# Patient Record
Sex: MALE | Race: WHITE | NOT HISPANIC OR LATINO
[De-identification: names, ages, dates, MRNs, and addresses within clinical notes are randomized per-mention and may not be internally consistent; named-entity substitution may affect disease eponyms.]

---

## 2018-11-29 PROBLEM — Z00.00 ENCOUNTER FOR PREVENTIVE HEALTH EXAMINATION: Status: ACTIVE | Noted: 2018-11-29

## 2018-12-28 ENCOUNTER — APPOINTMENT (OUTPATIENT)
Dept: GASTROENTEROLOGY | Facility: CLINIC | Age: 53
End: 2018-12-28
Payer: COMMERCIAL

## 2018-12-28 VITALS
DIASTOLIC BLOOD PRESSURE: 80 MMHG | BODY MASS INDEX: 36.01 KG/M2 | HEART RATE: 74 BPM | HEIGHT: 77 IN | WEIGHT: 305 LBS | SYSTOLIC BLOOD PRESSURE: 110 MMHG

## 2018-12-28 PROCEDURE — 99203 OFFICE O/P NEW LOW 30 MIN: CPT

## 2018-12-28 PROCEDURE — 82270 OCCULT BLOOD FECES: CPT

## 2019-02-04 LAB
ALBUMIN SERPL ELPH-MCNC: 4.7 G/DL
ALP BLD-CCNC: 76 U/L
ALT SERPL-CCNC: 40 U/L
ANION GAP SERPL CALC-SCNC: 12 MMOL/L
AST SERPL-CCNC: 26 U/L
BASOPHILS # BLD AUTO: 0.01 K/UL
BASOPHILS NFR BLD AUTO: 0.2 %
BILIRUB SERPL-MCNC: 1.5 MG/DL
BUN SERPL-MCNC: 13 MG/DL
CALCIUM SERPL-MCNC: 9.4 MG/DL
CHLORIDE SERPL-SCNC: 104 MMOL/L
CO2 SERPL-SCNC: 25 MMOL/L
CREAT SERPL-MCNC: 0.89 MG/DL
EOSINOPHIL # BLD AUTO: 0.04 K/UL
EOSINOPHIL NFR BLD AUTO: 0.9 %
GLUCOSE SERPL-MCNC: 105 MG/DL
HCT VFR BLD CALC: 44.3 %
HGB BLD-MCNC: 14.6 G/DL
IMM GRANULOCYTES NFR BLD AUTO: 0.2 %
INR PPP: 0.99 RATIO
LYMPHOCYTES # BLD AUTO: 1.25 K/UL
LYMPHOCYTES NFR BLD AUTO: 28.4 %
MAN DIFF?: NORMAL
MCHC RBC-ENTMCNC: 28.9 PG
MCHC RBC-ENTMCNC: 33 GM/DL
MCV RBC AUTO: 87.7 FL
MONOCYTES # BLD AUTO: 0.53 K/UL
MONOCYTES NFR BLD AUTO: 12 %
NEUTROPHILS # BLD AUTO: 2.56 K/UL
NEUTROPHILS NFR BLD AUTO: 58.3 %
PLATELET # BLD AUTO: 231 K/UL
POTASSIUM SERPL-SCNC: 4.2 MMOL/L
PROT SERPL-MCNC: 6.8 G/DL
PT BLD: 11.3 SEC
RBC # BLD: 5.05 M/UL
RBC # FLD: 12.9 %
SODIUM SERPL-SCNC: 141 MMOL/L
WBC # FLD AUTO: 4.4 K/UL

## 2019-02-13 ENCOUNTER — APPOINTMENT (OUTPATIENT)
Dept: GASTROENTEROLOGY | Facility: GI CENTER | Age: 54
End: 2019-02-13
Payer: COMMERCIAL

## 2019-02-13 ENCOUNTER — RESULT REVIEW (OUTPATIENT)
Age: 54
End: 2019-02-13

## 2019-02-13 ENCOUNTER — OUTPATIENT (OUTPATIENT)
Dept: OUTPATIENT SERVICES | Facility: HOSPITAL | Age: 54
LOS: 1 days | End: 2019-02-13
Payer: COMMERCIAL

## 2019-02-13 DIAGNOSIS — Z80.0 FAMILY HISTORY OF MALIGNANT NEOPLASM OF DIGESTIVE ORGANS: ICD-10-CM

## 2019-02-13 DIAGNOSIS — D12.2 BENIGN NEOPLASM OF ASCENDING COLON: ICD-10-CM

## 2019-02-13 DIAGNOSIS — Z86.010 PERSONAL HISTORY OF COLONIC POLYPS: ICD-10-CM

## 2019-02-13 PROCEDURE — 45385 COLONOSCOPY W/LESION REMOVAL: CPT

## 2019-02-13 PROCEDURE — 45380 COLONOSCOPY AND BIOPSY: CPT | Mod: PT,XS

## 2019-02-13 PROCEDURE — 45380 COLONOSCOPY AND BIOPSY: CPT | Mod: 59

## 2019-02-13 PROCEDURE — 88305 TISSUE EXAM BY PATHOLOGIST: CPT | Mod: 26

## 2019-02-13 PROCEDURE — 45385 COLONOSCOPY W/LESION REMOVAL: CPT | Mod: PT

## 2019-02-13 PROCEDURE — 88305 TISSUE EXAM BY PATHOLOGIST: CPT

## 2019-02-13 NOTE — PHYSICAL EXAM
[Sclera] : the sclera and conjunctiva were normal [PERRL With Normal Accommodation] : pupils were equal in size, round, and reactive to light [Extraocular Movements] : extraocular movements were intact [Outer Ear] : the ears and nose were normal in appearance [Oropharynx] : the oropharynx was normal [Neck Appearance] : the appearance of the neck was normal [Neck Cervical Mass (___cm)] : no neck mass was observed [Jugular Venous Distention Increased] : there was no jugular-venous distention [Thyroid Diffuse Enlargement] : the thyroid was not enlarged [Thyroid Nodule] : there were no palpable thyroid nodules [Auscultation Breath Sounds / Voice Sounds] : lungs were clear to auscultation bilaterally [Heart Rate And Rhythm] : heart rate was normal and rhythm regular [Heart Sounds] : normal S1 and S2 [Heart Sounds Gallop] : no gallops [Murmurs] : no murmurs [Heart Sounds Pericardial Friction Rub] : no pericardial rub [Bowel Sounds] : normal bowel sounds [Abdomen Soft] : soft [Abdomen Tenderness] : non-tender [Abdomen Mass (___ Cm)] : no abdominal mass palpated [Normal Sphincter Tone] : normal sphincter tone [No Rectal Mass] : no rectal mass [Internal Hemorrhoid] : no internal hemorrhoids [External Hemorrhoid] : no external hemorrhoids [Occult Blood Positive] : stool was negative for occult blood [FreeTextEntry1] : no stool or blood in the rectal vault [Abnormal Walk] : normal gait [Nail Clubbing] : no clubbing  or cyanosis of the fingernails [Musculoskeletal - Swelling] : no joint swelling seen [Motor Tone] : muscle strength and tone were normal [Skin Color & Pigmentation] : normal skin color and pigmentation [Skin Turgor] : normal skin turgor [] : no rash

## 2019-02-13 NOTE — REASON FOR VISIT
[Follow-Up: _____] : a [unfilled] follow-up visit [Colonoscopy] : a colonoscopy [FreeTextEntry2] : PH of colon polyps

## 2019-02-13 NOTE — HISTORY OF PRESENT ILLNESS
[FreeTextEntry1] : 54 yo WM with PH of colon polyps + FH.  Last colonoscopy 4 yrs ago;  HLD.  All recent BW is normal.

## 2019-02-13 NOTE — ASSESSMENT
[FreeTextEntry1] : 4 small polyps removed.  Hold aspirin x 1 week.  Call in 1 week for path check.  Repeat colonoscopy in 3 yrs for polyp surveillance.

## 2019-02-13 NOTE — PROCEDURE
[With Biopsy] : with biopsy [With Snare Polypectomy] : snare polypectomy [With Cautery] : cautery [Hx of Colon Polyps] : history of colon polyps [Procedure Explained] : The procedure was explained [Allergies Reviewed] : allergies reviewed. [Risks] : Risks [Benefits] : benefits [Alternatives] : alternatives [Consent Obtained] : written consent was obtained prior to the procedure and is detailed in the patient's record [Patient] : the patient [Bowel Prep Kit] : the patient took the appropriate bowel preparation kit as directed [Approved Diet Followed] : the patient avoided solid foods and adhered to the approved diet list for 24 hours prior to the procedure [Automated Blood Pressure Cuff] : automated blood pressure cuff [Cardiac Monitor] : cardiac monitor [Pulse Oximeter] : pulse oximeter [Propofol ___ mg IV] : Propofol [unfilled] ~Umg intravenously [2] : 2 [Prep Qualtiy: ___] : Prep Quality:  [unfilled] [Withdrawal Time: ___] : Withdrawal Time:  [unfilled] [Left Lateral Decubitus] : The patient was positioned in the left lateral decubitus position [Abnormal Rectum] : a normal rectum [External Hemorrhoids] : no external hemorrhoids [Normal Prostate] : a normal prostate [Terminal Ileum via Ileocecal Valve] : and the terminal ileum was examined by entering the ileocecal valve [No Difficulty] : without difficulty [Insufflated] : insufflated [Multiple Passes Needed] : after multiple passes [Retroflex View] : a retroflex view of the rectum was performed [Biopsy] : biopsy [Polyps] : polyps [Hot Snare Polypectomy] : hot snare polypectomy [Normal] : Normal [Sent to Pathology] : was sent to pathology for analysis [Tolerated Well] : the patient tolerated the procedure well [Vital Signs Stable] : the vital signs were stable [No Complications] : There were no complications [de-identified] : Trilyte / split dose [de-identified] : TI / ICV were normal.  [de-identified] : At 70 cm a diminutive 3 mm sessile leopoldo 2a polyp was removed with 2 bites of the solomon bx forceps; recovered to path; site clean/ dry.   [de-identified] : At 60 cm in mid Tr colon a diminutive 6 mm leopoldo 1s  polyp was removed with hot snare; recovered to path; site clean/ dry. \par At 50 cm in distal tr colon a diminutive leopoldo 2a sessile 2 mm polyp was removed with a single bite of the solomon bx forceps; recovered to path; site clean/ dry.    [de-identified] : At 40 cm in mid DC a sessile leopoldo 1s 8mm polyp was removed with hot snare and recovered to path; site clean/ dry.  No other lesions were noted.  [de-identified] : Including a retroflexion exam.   [de-identified] : 4 polyps all small to diminutive.

## 2019-02-13 NOTE — HISTORY OF PRESENT ILLNESS
[FreeTextEntry1] : 52 yo WM with PH of colon polyps + FH.  Last colonoscopy 4 yrs ago;  HLD.  All recent BW is normal.

## 2019-02-13 NOTE — PROCEDURE
[With Biopsy] : with biopsy [With Snare Polypectomy] : snare polypectomy [With Cautery] : cautery [Hx of Colon Polyps] : history of colon polyps [Procedure Explained] : The procedure was explained [Allergies Reviewed] : allergies reviewed. [Risks] : Risks [Benefits] : benefits [Alternatives] : alternatives [Consent Obtained] : written consent was obtained prior to the procedure and is detailed in the patient's record [Patient] : the patient [Bowel Prep Kit] : the patient took the appropriate bowel preparation kit as directed [Approved Diet Followed] : the patient avoided solid foods and adhered to the approved diet list for 24 hours prior to the procedure [Automated Blood Pressure Cuff] : automated blood pressure cuff [Cardiac Monitor] : cardiac monitor [Pulse Oximeter] : pulse oximeter [Propofol ___ mg IV] : Propofol [unfilled] ~Umg intravenously [2] : 2 [Prep Qualtiy: ___] : Prep Quality:  [unfilled] [Withdrawal Time: ___] : Withdrawal Time:  [unfilled] [Left Lateral Decubitus] : The patient was positioned in the left lateral decubitus position [Abnormal Rectum] : a normal rectum [External Hemorrhoids] : no external hemorrhoids [Normal Prostate] : a normal prostate [Terminal Ileum via Ileocecal Valve] : and the terminal ileum was examined by entering the ileocecal valve [No Difficulty] : without difficulty [Insufflated] : insufflated [Multiple Passes Needed] : after multiple passes [Retroflex View] : a retroflex view of the rectum was performed [Biopsy] : biopsy [Polyps] : polyps [Hot Snare Polypectomy] : hot snare polypectomy [Normal] : Normal [Sent to Pathology] : was sent to pathology for analysis [Tolerated Well] : the patient tolerated the procedure well [Vital Signs Stable] : the vital signs were stable [No Complications] : There were no complications [de-identified] : Trilyte / split dose [de-identified] : TI / ICV were normal.  [de-identified] : At 70 cm a diminutive 3 mm sessile leopoldo 2a polyp was removed with 2 bites of the solomon bx forceps; recovered to path; site clean/ dry.   [de-identified] : At 60 cm in mid Tr colon a diminutive 6 mm leopoldo 1s  polyp was removed with hot snare; recovered to path; site clean/ dry. \par At 50 cm in distal tr colon a diminutive leopoldo 2a sessile 2 mm polyp was removed with a single bite of the solomon bx forceps; recovered to path; site clean/ dry.    [de-identified] : At 40 cm in mid DC a sessile leopoldo 1s 8mm polyp was removed with hot snare and recovered to path; site clean/ dry.  No other lesions were noted.  [de-identified] : Including a retroflexion exam.   [de-identified] : 4 polyps all small to diminutive.

## 2019-02-15 LAB — SURGICAL PATHOLOGY STUDY: SIGNIFICANT CHANGE UP

## 2022-05-24 ENCOUNTER — APPOINTMENT (OUTPATIENT)
Dept: GASTROENTEROLOGY | Facility: CLINIC | Age: 57
End: 2022-05-24
Payer: COMMERCIAL

## 2022-05-24 VITALS
DIASTOLIC BLOOD PRESSURE: 80 MMHG | TEMPERATURE: 98 F | WEIGHT: 304 LBS | SYSTOLIC BLOOD PRESSURE: 140 MMHG | OXYGEN SATURATION: 98 % | BODY MASS INDEX: 35.89 KG/M2 | RESPIRATION RATE: 16 BRPM | HEIGHT: 77 IN | HEART RATE: 73 BPM

## 2022-05-24 DIAGNOSIS — Z12.11 ENCOUNTER FOR SCREENING FOR MALIGNANT NEOPLASM OF COLON: ICD-10-CM

## 2022-05-24 PROCEDURE — 99204 OFFICE O/P NEW MOD 45 MIN: CPT

## 2022-05-24 RX ORDER — ATORVASTATIN CALCIUM 10 MG/1
10 TABLET, FILM COATED ORAL
Refills: 0 | Status: ACTIVE | COMMUNITY

## 2022-05-24 RX ORDER — ASPIRIN 81 MG
81 TABLET, DELAYED RELEASE (ENTERIC COATED) ORAL
Refills: 0 | Status: ACTIVE | COMMUNITY

## 2022-05-24 RX ORDER — POLYETHYLENE GLYOCOL 3350, SODIUM CHLORIDE, SODIUM BICARBONATE AND POTASSIUM CHLORIDE 420; 11.2; 5.72; 1.48 G/4L; G/4L; G/4L; G/4L
420 POWDER, FOR SOLUTION NASOGASTRIC; ORAL
Qty: 1 | Refills: 0 | Status: ACTIVE | COMMUNITY
Start: 2018-12-28

## 2022-05-24 RX ORDER — POLYETHYLENE GLYCOL-3350, SODIUM CHLORIDE, POTASSIUM CHLORIDE AND SODIUM BICARBONATE 420; 11.2; 5.72; 1.48 G/438.4G; G/438.4G; G/438.4G; G/438.4G
420 POWDER, FOR SOLUTION ORAL
Qty: 1 | Refills: 0 | Status: ACTIVE | COMMUNITY
Start: 2022-05-24 | End: 1900-01-01

## 2022-05-24 NOTE — HISTORY OF PRESENT ILLNESS
[FreeTextEntry1] : 56-year-old white male with history of hyperlipidemia and obesity with BMI of 36.\par Positive family history of 1 first-degree relative, mother with colon cancer at age 62.\par Positive personal history of colon polyps.  Initial colonoscopy done in 2015 revealed a large sigmoid colon 3 cm villotubular adenoma which was completely removed.  Also 2 other tubular adenomas.\par Repeat colonoscopy in 2019 revealed 4 small tubular adenomas will completely removed.\par Patient remains asymptomatic from a GI point of view.  He is passing normal bowel movements.  He denies having any rectal bleeding abdominal pain or alteration in pattern of bowel movements.  No history of anemia.  Recent blood work was done via PCP Dr. Jane West about 3 months ago and all was normal.\par Patient had pulmonary COVID in 11/20 but did not require hospitalization or unusual treatment.  He is not left with any deficit.  Remains robust.  No cardiopulmonary issues.  Only medication is for hyperlipidemia.  Patient takes occasional aspirin.  No hypertension diabetes or heart disease.  No shortness of breath or JOYA.\par \par

## 2022-05-24 NOTE — ASSESSMENT
[FreeTextEntry1] : 1 first-degree relative with colon cancer, mother at age 62.  Personal history of colon polyps including large villotubular adenoma initially removed 7 years ago.  Appropriate candidate for surveillance colonoscopy.  Currently asymptomatic from a GI point of view.  Completely recovered from COVID.  Past surgical history none.  As such a surveillance colonoscopy was offered to the patient.  The procedure, its risk benefits and prep, were explained to the patient, who understands and is agreeable to proceed.  ASA #2.  Blood work from PCP will be reviewed when available.  Additional blood work is not anticipated to be necessary.  Patient is in optimal medical condition to undergo planned procedure.  No cardiopulmonary symptoms.  Gavel light prep to be utilized, split dose fashion.  Arrangements to be made.  Results to follow.

## 2022-05-24 NOTE — REASON FOR VISIT
[Consultation] : a consultation visit [FreeTextEntry1] : Positive family history for colon cancer.  Positive personal history of colon polyps

## 2022-05-24 NOTE — CONSULT LETTER
[Dear  ___] : Dear  [unfilled], [Consult Letter:] : I had the pleasure of evaluating your patient, [unfilled]. [Please see my note below.] : Please see my note below. [Consult Closing:] : Thank you very much for allowing me to participate in the care of this patient.  If you have any questions, please do not hesitate to contact me. [Sincerely,] : Sincerely, [FreeTextEntry1] : Increased risk for development of colorectal neoplasia as a result of personal history of colon polyps and positive family history of one first-degree relative.  Therefore surveillance colonoscopy is appropriate and will be arranged in the near future.  Results to follow. [FreeTextEntry3] : Delfin Lane MD FACG\par Diplomate American Board of Internal Medicine and Gastroenterolgy\par [facility]\par

## 2022-06-28 ENCOUNTER — TRANSCRIPTION ENCOUNTER (OUTPATIENT)
Age: 57
End: 2022-06-28

## 2022-06-29 ENCOUNTER — OUTPATIENT (OUTPATIENT)
Dept: OUTPATIENT SERVICES | Facility: HOSPITAL | Age: 57
LOS: 1 days | End: 2022-06-29
Payer: COMMERCIAL

## 2022-06-29 ENCOUNTER — APPOINTMENT (OUTPATIENT)
Dept: GASTROENTEROLOGY | Facility: GI CENTER | Age: 57
End: 2022-06-29
Payer: COMMERCIAL

## 2022-06-29 ENCOUNTER — NON-APPOINTMENT (OUTPATIENT)
Age: 57
End: 2022-06-29

## 2022-06-29 ENCOUNTER — RESULT REVIEW (OUTPATIENT)
Age: 57
End: 2022-06-29

## 2022-06-29 DIAGNOSIS — Z80.0 FAMILY HISTORY OF MALIGNANT NEOPLASM OF DIGESTIVE ORGANS: ICD-10-CM

## 2022-06-29 DIAGNOSIS — Z86.39 PERSONAL HISTORY OF OTHER ENDOCRINE, NUTRITIONAL AND METABOLIC DISEASE: ICD-10-CM

## 2022-06-29 DIAGNOSIS — D17.5 BENIGN LIPOMATOUS NEOPLASM OF INTRA-ABDOMINAL ORGANS: ICD-10-CM

## 2022-06-29 DIAGNOSIS — K63.5 POLYP OF COLON: ICD-10-CM

## 2022-06-29 DIAGNOSIS — Z12.11 ENCOUNTER FOR SCREENING FOR MALIGNANT NEOPLASM OF COLON: ICD-10-CM

## 2022-06-29 DIAGNOSIS — Z86.010 PERSONAL HISTORY OF COLONIC POLYPS: ICD-10-CM

## 2022-06-29 PROCEDURE — 88305 TISSUE EXAM BY PATHOLOGIST: CPT | Mod: 26

## 2022-06-29 PROCEDURE — 88305 TISSUE EXAM BY PATHOLOGIST: CPT

## 2022-06-29 PROCEDURE — 45380 COLONOSCOPY AND BIOPSY: CPT | Mod: PT

## 2022-06-29 PROCEDURE — 45380 COLONOSCOPY AND BIOPSY: CPT | Mod: 33

## 2022-06-29 NOTE — PHYSICAL EXAM
[Sclera] : the sclera and conjunctiva were normal [PERRL With Normal Accommodation] : pupils were equal in size, round, and reactive to light [Extraocular Movements] : extraocular movements were intact [Outer Ear] : the ears and nose were normal in appearance [Oropharynx] : the oropharynx was normal [Neck Appearance] : the appearance of the neck was normal [Neck Cervical Mass (___cm)] : no neck mass was observed [Jugular Venous Distention Increased] : there was no jugular-venous distention [Thyroid Diffuse Enlargement] : the thyroid was not enlarged [Thyroid Nodule] : there were no palpable thyroid nodules [Auscultation Breath Sounds / Voice Sounds] : lungs were clear to auscultation bilaterally [Heart Rate And Rhythm] : heart rate was normal and rhythm regular [Heart Sounds] : normal S1 and S2 [Heart Sounds Gallop] : no gallops [Murmurs] : no murmurs [Heart Sounds Pericardial Friction Rub] : no pericardial rub [Abdomen Soft] : soft [Bowel Sounds] : normal bowel sounds [Abdomen Tenderness] : non-tender [Abdomen Mass (___ Cm)] : no abdominal mass palpated [Normal Sphincter Tone] : normal sphincter tone [No Rectal Mass] : no rectal mass [Internal Hemorrhoid] : no internal hemorrhoids [External Hemorrhoid] : no external hemorrhoids [Occult Blood Positive] : stool was negative for occult blood [FreeTextEntry1] : no stool or blood in the rectal vault [Abnormal Walk] : normal gait [Nail Clubbing] : no clubbing  or cyanosis of the fingernails [Musculoskeletal - Swelling] : no joint swelling seen [Motor Tone] : muscle strength and tone were normal [Skin Turgor] : normal skin turgor [Skin Color & Pigmentation] : normal skin color and pigmentation [] : no rash

## 2022-06-29 NOTE — HISTORY OF PRESENT ILLNESS
[FreeTextEntry1] : 56-year-old white male with history of hyperlipidemia and obesity BMI 36 and positive family history of colon cancer in 1 first-degree relative, mother at 62; positive personal history of colon polyps.  Status post removal of a large villotubular adenoma from sigmoid colon in 2015.  A surveillance colonoscopy in 2019 revealed 4 separate small tubular adenomas were removed.  Patient remains asymptomatic.  Recent blood work was done via PCP not currently available.  Prior blood work from 2019 was normal.  No new medical issues.  On atorvastatin for hyperlipidemia.  1 Ecotrin per day.  Completed gavel light prep.  COVID swab negative.  No cardiopulmonary issues.

## 2022-07-01 LAB — SURGICAL PATHOLOGY STUDY: SIGNIFICANT CHANGE UP

## 2022-07-14 ENCOUNTER — APPOINTMENT (OUTPATIENT)
Dept: SURGERY | Facility: CLINIC | Age: 57
End: 2022-07-14

## 2022-07-14 VITALS
SYSTOLIC BLOOD PRESSURE: 149 MMHG | DIASTOLIC BLOOD PRESSURE: 92 MMHG | HEIGHT: 77 IN | RESPIRATION RATE: 16 BRPM | HEART RATE: 81 BPM | OXYGEN SATURATION: 98 % | TEMPERATURE: 98 F | WEIGHT: 297 LBS | BODY MASS INDEX: 35.07 KG/M2

## 2022-07-14 DIAGNOSIS — Z78.9 OTHER SPECIFIED HEALTH STATUS: ICD-10-CM

## 2022-07-14 PROCEDURE — 99243 OFF/OP CNSLTJ NEW/EST LOW 30: CPT

## 2022-07-14 NOTE — HISTORY OF PRESENT ILLNESS
[de-identified] : The patient comes to the office in consultation by Dr. 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  The patient denies any abdominal pain, nausea or vomit.  He has no changes in his bowel function and denies any BRBPR.  The patient does have a family history of colon cancer.

## 2022-07-14 NOTE — CONSULT LETTER
[Dear  ___] : Dear  [unfilled], [Consult Letter:] : I had the pleasure of evaluating your patient, [unfilled]. [Please see my note below.] : Please see my note below. [Consult Closing:] : Thank you very much for allowing me to participate in the care of this patient.  If you have any questions, please do not hesitate to contact me. [Sincerely,] : Sincerely, [FreeTextEntry3] : Kevin Prieto MD, FACS\par  \par Department of Surgery\par [facility]\par [facility]\par

## 2022-07-14 NOTE — ASSESSMENT
[FreeTextEntry1] : The patient is an obese 56 year old male who has been identified to have a mucocele of the appendix on recent colonoscopy.  The patient at this point has been advised that he will benefit from removal of the appendix.  He will first need a CT scan to evaluate the appendix in more detail to exclude suggestions of malignant features as this will potentially change the surgical approach.  He has been advised that although the majority of these tend to be benign, there is a concern for possible malignancy.   The patient admitted understanding and will proceed with the CT scan as planned.  He will return upon completion for further recommendations.  A total of 40 minutes was spent coordinating the patient's care.

## 2022-07-14 NOTE — PHYSICAL EXAM
[JVD] : no jugular venous distention  [Abdominal Masses] : No abdominal masses [Abdomen Tenderness] : ~T ~M No abdominal tenderness [Purpura] : no purpura  [Petechiae] : no petechiae [Skin Ulcer] : no ulcer [Skin Induration] : no induration [Alert] : alert [Oriented to Person] : oriented to person [Oriented to Place] : oriented to place [Oriented to Time] : oriented to time [Calm] : calm [de-identified] : non toxic, in no acute distress  [de-identified] : NC/AT PERRL EOMI no scleral icterus  [de-identified] : trachea midline, no gross mass  [de-identified] : no audible wheezing or stridor  [de-identified] : obese soft, no localizing tenderness, no guarding, no rebound, no masses  [de-identified] : FROM of all extremities with no gross deformity or angulation, there is a small umbilical hernia, no ventral hernia  [de-identified] : mood is calm

## 2022-07-14 NOTE — DATA REVIEWED
[FreeTextEntry1] : Independent review of the colonoscopy report reveals a mucocele of the appendix with no associated mass, there was a polyp removed of the distal transverse colon

## 2022-07-29 ENCOUNTER — APPOINTMENT (OUTPATIENT)
Dept: CT IMAGING | Facility: CLINIC | Age: 57
End: 2022-07-29

## 2022-07-29 ENCOUNTER — OUTPATIENT (OUTPATIENT)
Dept: OUTPATIENT SERVICES | Facility: HOSPITAL | Age: 57
LOS: 1 days | End: 2022-07-29
Payer: COMMERCIAL

## 2022-07-29 DIAGNOSIS — Z86.010 PERSONAL HISTORY OF COLONIC POLYPS: ICD-10-CM

## 2022-07-29 PROCEDURE — 74177 CT ABD & PELVIS W/CONTRAST: CPT | Mod: 26

## 2022-07-29 PROCEDURE — 74177 CT ABD & PELVIS W/CONTRAST: CPT

## 2022-08-08 ENCOUNTER — APPOINTMENT (OUTPATIENT)
Dept: SURGERY | Facility: CLINIC | Age: 57
End: 2022-08-08

## 2022-08-08 VITALS
HEART RATE: 76 BPM | DIASTOLIC BLOOD PRESSURE: 71 MMHG | OXYGEN SATURATION: 96 % | RESPIRATION RATE: 14 BRPM | BODY MASS INDEX: 35.3 KG/M2 | TEMPERATURE: 97.2 F | WEIGHT: 299 LBS | SYSTOLIC BLOOD PRESSURE: 118 MMHG | HEIGHT: 77 IN

## 2022-08-08 PROCEDURE — 99214 OFFICE O/P EST MOD 30 MIN: CPT

## 2022-08-08 NOTE — ASSESSMENT
[FreeTextEntry1] : The patient is an obese male with findings on colonoscopic concerning for a mucocele of the appendix.  The patient had a CT scan that reveals a normal appearing appendix with no evidence of mucocele.  The patient at this point has been advised that I am in favor of follow up imaging in 3 months to reevaluate the appendix.  I do not believe that appendectomy at this time is indicated in light of the CT scan findings.  The patient is in agreement with the treatment strategy.  He will follow up in 4 weeks to reassess if any symptoms develop and to set him up for a repeat CT scan for 3 months.  A total of 30 minutes was spent coordinating the patient's care.

## 2022-08-08 NOTE — HISTORY OF PRESENT ILLNESS
[de-identified] : The patient returns to the office with no complaints.  The patient is without abdominal pain, nausea, or vomit.  The patient is without blood per rectum and has no changes in his bowel function.

## 2022-08-08 NOTE — PHYSICAL EXAM
[JVD] : no jugular venous distention  [Abdominal Masses] : No abdominal masses [Abdomen Tenderness] : ~T ~M No abdominal tenderness [Purpura] : no purpura  [Petechiae] : no petechiae [Skin Ulcer] : no ulcer [Skin Induration] : no induration [Alert] : alert [Oriented to Person] : oriented to person [Oriented to Place] : oriented to place [Oriented to Time] : oriented to time [Calm] : calm [de-identified] : non toxic, in no acute distress  [de-identified] : NC/AT PERRL EOMI no scleral icterus  [de-identified] : trachea midline, no gross mass  [de-identified] : no audible wheezing or stridor  [de-identified] : obese soft, remains with no localizing tenderness, no guarding, no rebound, no masses  [de-identified] : FROM of all extremities with no gross deformity or angulation, there is a small umbilical hernia, no ventral hernia  [de-identified] : mood is calm

## 2022-08-08 NOTE — DATA REVIEWED
[FreeTextEntry1] : Independent review of the abd/pel CT scan reveals a normal appearing appedix with no evidence of mucocele.

## 2022-09-07 ENCOUNTER — APPOINTMENT (OUTPATIENT)
Dept: SURGERY | Facility: CLINIC | Age: 57
End: 2022-09-07

## 2022-09-07 VITALS
OXYGEN SATURATION: 98 % | WEIGHT: 302 LBS | HEIGHT: 77 IN | SYSTOLIC BLOOD PRESSURE: 154 MMHG | TEMPERATURE: 97.3 F | BODY MASS INDEX: 35.66 KG/M2 | DIASTOLIC BLOOD PRESSURE: 93 MMHG | HEART RATE: 70 BPM | RESPIRATION RATE: 16 BRPM

## 2022-09-07 PROCEDURE — 99213 OFFICE O/P EST LOW 20 MIN: CPT

## 2022-09-07 NOTE — PHYSICAL EXAM
[JVD] : no jugular venous distention  [Abdominal Masses] : No abdominal masses [Abdomen Tenderness] : ~T ~M No abdominal tenderness [Purpura] : no purpura  [Petechiae] : no petechiae [Skin Ulcer] : no ulcer [Skin Induration] : no induration [Alert] : alert [Oriented to Person] : oriented to person [Oriented to Place] : oriented to place [Oriented to Time] : oriented to time [Calm] : calm [de-identified] : non toxic, in no acute distress  [de-identified] : NC/AT PERRL EOMI no scleral icterus  [de-identified] : trachea midline, no gross mass  [de-identified] : no audible wheezing or stridor  [de-identified] : obese soft, remains with no localizing tenderness, no guarding, no rebound, no masses  [de-identified] : FROM of all extremities with no gross deformity or angulation, there is a small umbilical hernia, no ventral hernia  [de-identified] : mood is calm

## 2022-09-07 NOTE — HISTORY OF PRESENT ILLNESS
[de-identified] : The patient returns to the office with no complaints.  He has no abdominal pain, nausea, or vomit. He has no changes in his bowel function.

## 2022-09-07 NOTE — ASSESSMENT
[FreeTextEntry1] : The patient is a 56 year old male with a question of an appendiceal mucocele.  The patient is completely asymptomatic. He is to undergo an interval CT scan to assess the appendix.  He will follow up in 8 weeks to set up the follow up CT scan imaging.  A total of 20 minutes was spent coordinating the care of the patient.

## 2022-11-14 ENCOUNTER — APPOINTMENT (OUTPATIENT)
Dept: SURGERY | Facility: CLINIC | Age: 57
End: 2022-11-14

## 2022-11-14 VITALS
RESPIRATION RATE: 16 BRPM | TEMPERATURE: 97.4 F | SYSTOLIC BLOOD PRESSURE: 146 MMHG | BODY MASS INDEX: 35.54 KG/M2 | WEIGHT: 301 LBS | HEIGHT: 77 IN | OXYGEN SATURATION: 98 % | DIASTOLIC BLOOD PRESSURE: 87 MMHG | HEART RATE: 71 BPM

## 2022-11-14 PROCEDURE — 99213 OFFICE O/P EST LOW 20 MIN: CPT

## 2022-11-14 NOTE — ASSESSMENT
[FreeTextEntry1] : The patient is a 57 year old male with a question of an appendiceal mucocele.  The patient at this point will benefit from follow up imaging to assess the appendix.  Further recommendations will follow review of the study. A total of 20 minutes was spent coordinating the patient's care.

## 2022-11-14 NOTE — PHYSICAL EXAM
[JVD] : no jugular venous distention  [Abdominal Masses] : No abdominal masses [Abdomen Tenderness] : ~T ~M No abdominal tenderness [Purpura] : no purpura  [Petechiae] : no petechiae [Skin Ulcer] : no ulcer [Skin Induration] : no induration [Alert] : alert [Oriented to Person] : oriented to person [Oriented to Place] : oriented to place [Oriented to Time] : oriented to time [Calm] : calm [de-identified] : NC/AT PERRL EOMI no scleral icterus  [de-identified] : non toxic, in no acute distress  [de-identified] : trachea midline, no gross mass  [de-identified] : no audible wheezing or stridor  [de-identified] : obese soft, remains with no localizing tenderness, no guarding, no rebound, no masses  [de-identified] : FROM of all extremities with no gross deformity or angulation, there is a small umbilical hernia, no ventral hernia  [de-identified] : mood is calm

## 2022-11-14 NOTE — HISTORY OF PRESENT ILLNESS
[de-identified] : The patient returns with no abdominal pain, nausea, or vomit.  The patient has no changes in his bowel function, no BRBPR or mucus.  He states he has been feeling well.

## 2022-11-15 ENCOUNTER — RESULT REVIEW (OUTPATIENT)
Age: 57
End: 2022-11-15

## 2022-11-28 ENCOUNTER — OUTPATIENT (OUTPATIENT)
Dept: OUTPATIENT SERVICES | Facility: HOSPITAL | Age: 57
LOS: 1 days | End: 2022-11-28
Payer: COMMERCIAL

## 2022-11-28 ENCOUNTER — APPOINTMENT (OUTPATIENT)
Dept: CT IMAGING | Facility: CLINIC | Age: 57
End: 2022-11-28

## 2022-11-28 DIAGNOSIS — K38.8 OTHER SPECIFIED DISEASES OF APPENDIX: ICD-10-CM

## 2022-11-28 PROCEDURE — 74177 CT ABD & PELVIS W/CONTRAST: CPT

## 2022-11-28 PROCEDURE — 74177 CT ABD & PELVIS W/CONTRAST: CPT | Mod: 26

## 2022-12-14 ENCOUNTER — APPOINTMENT (OUTPATIENT)
Dept: SURGERY | Facility: CLINIC | Age: 57
End: 2022-12-14

## 2022-12-14 VITALS
TEMPERATURE: 96.3 F | RESPIRATION RATE: 16 BRPM | HEART RATE: 85 BPM | HEIGHT: 77 IN | OXYGEN SATURATION: 98 % | BODY MASS INDEX: 36.37 KG/M2 | SYSTOLIC BLOOD PRESSURE: 146 MMHG | DIASTOLIC BLOOD PRESSURE: 87 MMHG | WEIGHT: 308 LBS

## 2022-12-14 DIAGNOSIS — Z01.818 ENCOUNTER FOR OTHER PREPROCEDURAL EXAMINATION: ICD-10-CM

## 2022-12-14 DIAGNOSIS — K38.8 OTHER SPECIFIED DISEASES OF APPENDIX: ICD-10-CM

## 2022-12-14 DIAGNOSIS — E66.9 OBESITY, UNSPECIFIED: ICD-10-CM

## 2022-12-14 DIAGNOSIS — K42.9 UMBILICAL HERNIA W/OUT OBSTRUCTION OR GANGRENE: ICD-10-CM

## 2022-12-14 PROCEDURE — 99214 OFFICE O/P EST MOD 30 MIN: CPT

## 2022-12-14 NOTE — PHYSICAL EXAM
[JVD] : no jugular venous distention  [Abdominal Masses] : No abdominal masses [Abdomen Tenderness] : ~T ~M No abdominal tenderness [Purpura] : no purpura  [Petechiae] : no petechiae [Skin Ulcer] : no ulcer [Skin Induration] : no induration [Alert] : alert [Oriented to Person] : oriented to person [Oriented to Place] : oriented to place [Oriented to Time] : oriented to time [Calm] : calm [de-identified] : non toxic, in no acute distress  [de-identified] : NC/AT PERRL EOMI no scleral icterus  [de-identified] : trachea midline, no gross mass  [de-identified] : no audible wheezing or stridor  [de-identified] : obese soft, remains with no localizing tenderness, no guarding, no rebound, no masses  [de-identified] : FROM of all extremities with no gross deformity or angulation, there remains a small umbilical hernia, no ventral hernia  [de-identified] : mood is calm

## 2022-12-14 NOTE — HISTORY OF PRESENT ILLNESS
[de-identified] : The patient returns to the office with no abdominal pain, nausea, or vomit.  The patient reports he has been feeling well and has no changes in his bowel function.  He has no blood per rectum.

## 2022-12-14 NOTE — ASSESSMENT
[FreeTextEntry1] : The patient is an obese 57 year old male with an appendiceal mucocele and umbilical hernia.  The patient has been advised that he will benefit from an appendectomy and partial cecectomy to clear mucocele. We will also repair the umbilical hernia in the same setting.  The risks, benefits, and alternatives including the option of doing nothing to a robotic, possible laparoscopic and possible open appendectomy and partial cecectomy and  open umbilical hernia repair with possible mesh were discussed.  The potential complications including but not limited to infection, bleeding, hernia recurrence, anastomotic leak, post operative abscess or collection, chronic post-operative pain, and seroma formation were discussed.  The patient was educated regarding the signs and symptoms of hernia strangulation and advised to seek immediate MD evaluation should this occur.  The patient understands and wishes to proceed at the next available time.  A total of 30 minutes was spent coordinating the patient's care. \par

## 2022-12-14 NOTE — DATA REVIEWED
[FreeTextEntry1] : Independent review of the abd/pel CT scan from 11/28/22 reveals a mucocele of the appendiceal orifice with no periappendiceal stranding or adenopathy.

## 2023-02-02 ENCOUNTER — OUTPATIENT (OUTPATIENT)
Dept: OUTPATIENT SERVICES | Facility: HOSPITAL | Age: 58
LOS: 1 days | End: 2023-02-02
Payer: COMMERCIAL

## 2023-02-02 VITALS
HEART RATE: 61 BPM | DIASTOLIC BLOOD PRESSURE: 80 MMHG | WEIGHT: 304.24 LBS | TEMPERATURE: 97 F | RESPIRATION RATE: 16 BRPM | SYSTOLIC BLOOD PRESSURE: 125 MMHG | OXYGEN SATURATION: 98 % | HEIGHT: 77 IN

## 2023-02-02 DIAGNOSIS — K42.9 UMBILICAL HERNIA WITHOUT OBSTRUCTION OR GANGRENE: ICD-10-CM

## 2023-02-02 DIAGNOSIS — Z98.890 OTHER SPECIFIED POSTPROCEDURAL STATES: Chronic | ICD-10-CM

## 2023-02-02 DIAGNOSIS — Z29.9 ENCOUNTER FOR PROPHYLACTIC MEASURES, UNSPECIFIED: ICD-10-CM

## 2023-02-02 DIAGNOSIS — Z91.89 OTHER SPECIFIED PERSONAL RISK FACTORS, NOT ELSEWHERE CLASSIFIED: ICD-10-CM

## 2023-02-02 DIAGNOSIS — K38.8 OTHER SPECIFIED DISEASES OF APPENDIX: ICD-10-CM

## 2023-02-02 DIAGNOSIS — Z01.818 ENCOUNTER FOR OTHER PREPROCEDURAL EXAMINATION: ICD-10-CM

## 2023-02-02 LAB
A1C WITH ESTIMATED AVERAGE GLUCOSE RESULT: 5.4 % — SIGNIFICANT CHANGE UP (ref 4–5.6)
ALBUMIN SERPL ELPH-MCNC: 4.3 G/DL — SIGNIFICANT CHANGE UP (ref 3.3–5.2)
ALP SERPL-CCNC: 81 U/L — SIGNIFICANT CHANGE UP (ref 40–120)
ALT FLD-CCNC: 28 U/L — SIGNIFICANT CHANGE UP
ANION GAP SERPL CALC-SCNC: 10 MMOL/L — SIGNIFICANT CHANGE UP (ref 5–17)
APTT BLD: 31.1 SEC — SIGNIFICANT CHANGE UP (ref 27.5–35.5)
AST SERPL-CCNC: 24 U/L — SIGNIFICANT CHANGE UP
BASOPHILS # BLD AUTO: 0.04 K/UL — SIGNIFICANT CHANGE UP (ref 0–0.2)
BASOPHILS NFR BLD AUTO: 0.8 % — SIGNIFICANT CHANGE UP (ref 0–2)
BILIRUB SERPL-MCNC: 1.5 MG/DL — SIGNIFICANT CHANGE UP (ref 0.4–2)
BLD GP AB SCN SERPL QL: SIGNIFICANT CHANGE UP
BUN SERPL-MCNC: 17.5 MG/DL — SIGNIFICANT CHANGE UP (ref 8–20)
CALCIUM SERPL-MCNC: 9.1 MG/DL — SIGNIFICANT CHANGE UP (ref 8.4–10.5)
CHLORIDE SERPL-SCNC: 105 MMOL/L — SIGNIFICANT CHANGE UP (ref 96–108)
CO2 SERPL-SCNC: 27 MMOL/L — SIGNIFICANT CHANGE UP (ref 22–29)
CREAT SERPL-MCNC: 0.82 MG/DL — SIGNIFICANT CHANGE UP (ref 0.5–1.3)
EGFR: 103 ML/MIN/1.73M2 — SIGNIFICANT CHANGE UP
EOSINOPHIL # BLD AUTO: 0.07 K/UL — SIGNIFICANT CHANGE UP (ref 0–0.5)
EOSINOPHIL NFR BLD AUTO: 1.4 % — SIGNIFICANT CHANGE UP (ref 0–6)
ESTIMATED AVERAGE GLUCOSE: 108 MG/DL — SIGNIFICANT CHANGE UP (ref 68–114)
GLUCOSE SERPL-MCNC: 111 MG/DL — HIGH (ref 70–99)
HCT VFR BLD CALC: 41.3 % — SIGNIFICANT CHANGE UP (ref 39–50)
HGB BLD-MCNC: 13.9 G/DL — SIGNIFICANT CHANGE UP (ref 13–17)
IMM GRANULOCYTES NFR BLD AUTO: 0.2 % — SIGNIFICANT CHANGE UP (ref 0–0.9)
INR BLD: 1.03 RATIO — SIGNIFICANT CHANGE UP (ref 0.88–1.16)
LYMPHOCYTES # BLD AUTO: 1.57 K/UL — SIGNIFICANT CHANGE UP (ref 1–3.3)
LYMPHOCYTES # BLD AUTO: 30.4 % — SIGNIFICANT CHANGE UP (ref 13–44)
MCHC RBC-ENTMCNC: 28.5 PG — SIGNIFICANT CHANGE UP (ref 27–34)
MCHC RBC-ENTMCNC: 33.7 GM/DL — SIGNIFICANT CHANGE UP (ref 32–36)
MCV RBC AUTO: 84.6 FL — SIGNIFICANT CHANGE UP (ref 80–100)
MONOCYTES # BLD AUTO: 0.52 K/UL — SIGNIFICANT CHANGE UP (ref 0–0.9)
MONOCYTES NFR BLD AUTO: 10.1 % — SIGNIFICANT CHANGE UP (ref 2–14)
NEUTROPHILS # BLD AUTO: 2.95 K/UL — SIGNIFICANT CHANGE UP (ref 1.8–7.4)
NEUTROPHILS NFR BLD AUTO: 57.1 % — SIGNIFICANT CHANGE UP (ref 43–77)
PLATELET # BLD AUTO: 221 K/UL — SIGNIFICANT CHANGE UP (ref 150–400)
POTASSIUM SERPL-MCNC: 3.9 MMOL/L — SIGNIFICANT CHANGE UP (ref 3.5–5.3)
POTASSIUM SERPL-SCNC: 3.9 MMOL/L — SIGNIFICANT CHANGE UP (ref 3.5–5.3)
PROT SERPL-MCNC: 6.8 G/DL — SIGNIFICANT CHANGE UP (ref 6.6–8.7)
PROTHROM AB SERPL-ACNC: 12 SEC — SIGNIFICANT CHANGE UP (ref 10.5–13.4)
RBC # BLD: 4.88 M/UL — SIGNIFICANT CHANGE UP (ref 4.2–5.8)
RBC # FLD: 12.2 % — SIGNIFICANT CHANGE UP (ref 10.3–14.5)
SODIUM SERPL-SCNC: 142 MMOL/L — SIGNIFICANT CHANGE UP (ref 135–145)
WBC # BLD: 5.16 K/UL — SIGNIFICANT CHANGE UP (ref 3.8–10.5)
WBC # FLD AUTO: 5.16 K/UL — SIGNIFICANT CHANGE UP (ref 3.8–10.5)

## 2023-02-02 PROCEDURE — 93005 ELECTROCARDIOGRAM TRACING: CPT

## 2023-02-02 PROCEDURE — 93010 ELECTROCARDIOGRAM REPORT: CPT

## 2023-02-02 PROCEDURE — G0463: CPT

## 2023-02-02 RX ORDER — BUPIVACAINE 13.3 MG/ML
20 INJECTION, SUSPENSION, LIPOSOMAL INFILTRATION ONCE
Refills: 0 | Status: DISCONTINUED | OUTPATIENT
Start: 2023-02-27 | End: 2023-02-27

## 2023-02-02 RX ORDER — CEFOTETAN DISODIUM 1 G
2 VIAL (EA) INJECTION ONCE
Refills: 0 | Status: DISCONTINUED | OUTPATIENT
Start: 2023-02-27 | End: 2023-02-27

## 2023-02-02 RX ORDER — SODIUM CHLORIDE 9 MG/ML
3 INJECTION INTRAMUSCULAR; INTRAVENOUS; SUBCUTANEOUS EVERY 8 HOURS
Refills: 0 | Status: DISCONTINUED | OUTPATIENT
Start: 2023-02-27 | End: 2023-02-27

## 2023-02-02 NOTE — H&P PST ADULT - NSICDXFAMILYHX_GEN_ALL_CORE_FT
FAMILY HISTORY:  Father  Still living? Unknown  Family history of early CAD, Age at diagnosis: Age Unknown    Mother  Still living? Unknown  FH: colon cancer, Age at diagnosis: Age Unknown

## 2023-02-02 NOTE — H&P PST ADULT - PROBLEM SELECTOR PLAN 1
Patient is scheduled for robotic possible laparoscopic possible open appendectomy with partial cecectomy on 2/27/23 with Dr Prieto.   Medical evaluation pending

## 2023-02-02 NOTE — H&P PST ADULT - HISTORY OF PRESENT ILLNESS
57 year old  male with a pmhx of vertigo, HLD, family hx of colon ca s/p colonoscopy May 2022, small polyp removed fluid found in appendix, CT at that time was negative, plan to repeat in 3 months   patient had CT 11/2022 that revealed rounded hypoattenuating fullness of the appendiceal orifice,   which may represent a mucocele.  Patient denies fever chills, RUQ pain, abdominal pain, nausea, vomiting, diarrhea, constipation  Patient is scheduled for robotic possible laparoscopic possible open appendectomy with partial cecectomy on 2/27/23 with Dr Prieto.   Medical evaluation  pending  57 year old  male with a pmhx of vertigo, HLD, family hx of colon ca s/p colonoscopy May 2022, small polyp removed fluid found in appendix, CT at that time was negative, plan to repeat in 3 months . Patient had CT 11/2022 that revealed rounded hypoattenuating fullness of the appendiceal orifice, which may represent a mucocele. Patient denies fever chills, RUQ pain, abdominal pain, nausea, vomiting, diarrhea, constipation. Patient is scheduled for robotic possible laparoscopic possible open appendectomy with partial cecectomy on 2/27/23 with Dr Prieto.   Medical evaluation  pending

## 2023-02-02 NOTE — H&P PST ADULT - ASSESSMENT
57 year old  male with a pmhx of vertigo, HLD, family hx of colon ca s/p colonoscopy May 2022, small polyp removed fluid found in appendix, CT at that time was negative, plan to repeat in 3 months . Patient had CT 2022 that revealed rounded hypoattenuating fullness of the appendiceal orifice, which may represent a mucocele. Abdomen is soft non tender, no RLQ . Patient is scheduled for robotic possible laparoscopic possible open appendectomy with partial cecectomy on 23 with Dr Prieto. Patient educated on surgical scrub, COVID testing, preadmission instructions, medical clearance and day of procedure medications, verbalizes understanding. Pt instructed to stop vitamins/supplements/herbal medications/ASA/NSAIDS for one week prior to surgery and discuss with PMD.     CAPRINI SCORE    AGE RELATED RISK FACTORS                                                             [x ] Age 41-60 years                                            (1 Point)  [ ] Age: 61-74 years                                           (2 Points)                 [ ] Age= 75 years                                                (3 Points)             DISEASE RELATED RISK FACTORS                                                       [ ] Edema in the lower extremities                 (1 Point)                     [ ] Varicose veins                                               (1 Point)                                 [x ] BMI > 25 Kg/m2                                            (1 Point)                                  [ ] Serious infection (ie PNA)                            (1 Point)                     [ ] Lung disease ( COPD, Emphysema)            (1 Point)                                                                          [ ] Acute myocardial infarction                         (1 Point)                  [ ] Congestive heart failure (in the previous month)  (1 Point)         [ ] Inflammatory bowel disease                            (1 Point)                  [ ] Central venous access, PICC or Port               (2 points)       (within the last month)                                                                [ ] Stroke (in the previous month)                        (5 Points)    [ ] Previous or present malignancy                       (2 points)                                                                                                                                                         HEMATOLOGY RELATED FACTORS                                                         [ ] Prior episodes of VTE                                     (3 Points)                     [ ] Positive family history for VTE                      (3 Points)                  [ ] Prothrombin 49408 A                                     (3 Points)                     [ ] Factor V Leiden                                                (3 Points)                        [ ] Lupus anticoagulants                                      (3 Points)                                                           [ ] Anticardiolipin antibodies                              (3 Points)                                                       [ ] High homocysteine in the blood                   (3 Points)                                             [ ] Other congenital or acquired thrombophilia      (3 Points)                                                [ ] Heparin induced thrombocytopenia                  (3 Points)                                        MOBILITY RELATED FACTORS  [ ] Bed rest                                                         (1 Point)  [ ] Plaster cast                                                    (2 points)  [ ] Bed bound for more than 72 hours           (2 Points)    GENDER SPECIFIC FACTORS  [ ] Pregnancy or had a baby within the last month   (1 Point)  [ ] Post-partum < 6 weeks                                   (1 Point)  [ ] Hormonal therapy  or oral contraception   (1 Point)  [ ] History of pregnancy complications              (1 point)  [ ] Unexplained or recurrent              (1 Point)    OTHER RISK FACTORS                                           (1 Point)  [ ] BMI >40, smoking, diabetes requiring insulin, chemotherapy  blood transfusions and length of surgery over 2 hours    SURGERY RELATED RISK FACTORS  [ ]  Section within the last month     (1 Point)  [ ] Minor surgery                                                  (1 Point)  [ ] Arthroscopic surgery                                       (2 Points)  [x ] Planned major surgery lasting more            (2 Points)      than 45 minutes     [ ] Elective hip or knee joint replacement       (5 points)       surgery                                                TRAUMA RELATED RISK FACTORS  [ ] Fracture of the hip, pelvis, or leg                       (5 Points)  [ ] Spinal cord injury resulting in paralysis             (5 points)       (in the previous month)    [ ] Paralysis  (less than 1 month)                             (5 Points)  [ ] Multiple Trauma within 1 month                        (5 Points)    Total Score [    4    ]    Caprini Score 0-2: Low Risk, NO VTE prophylaxis required for most patients, encourage ambulation  Caprini Score 3-6: Moderate Risk , pharmacologic VTE prophylaxis is indicated for most patients (in the absence of contraindications)  Caprini Score Greater than or =7: High risk, pharmocologic VTE prophylaxis indicated for most patients (in the absence of contraindications)        OPIOID RISK TOOL    DEANN EACH BOX THAT APPLIES AND ADD TOTALS AT THE END    FAMILY HISTORY OF SUBSTANCE ABUSE                 FEMALE         MALE                                                Alcohol                             [  ]1 pt          [  ]3pts                                               Illegal Durgs                     [  ]2 pts        [  ]3pts                                               Rx Drugs                           [  ]4 pts        [  ]4 pts    PERSONAL HISTORY OF SUBSTANCE ABUSE                                                                                          Alcohol                             [  ]3 pts       [  ]3 pts                                               Illegal Durgs                     [  ]4 pts        [  ]4 pts                                               Rx Drugs                           [  ]5 pts        [  ]5 pts    AGE BETWEEN 16-45 YEARS                                      [  ]1 pt         [  ]1 pt    HISTORY OF PREADOLESCENT   SEXUAL ABUSE                                                             [  ]3 pts        [  ]0pts    PSYCHOLOGICAL DISEASE                     ADD, OCD, Bipolar, Schizophrenia        [  ]2 pts         [  ]2 pts                      Depression                                               [  ]1 pt           [  ]1 pt           SCORING TOTAL  0                                A score of 3 or lower indicated LOW risk for future opiod abuse  A score of 4 to 7 indicated moderate risk for future opiod abuse  A score of 8 or higher indicates a high risk for opiod abuse

## 2023-02-02 NOTE — H&P PST ADULT - GASTROINTESTINAL
negative normal/soft/nontender/nondistended/normal active bowel sounds/no guarding/no rigidity/no organomegaly

## 2023-02-02 NOTE — H&P PST ADULT - NSANTHOSAYNRD_GEN_A_CORE
No. ELIO screening performed.  STOP BANG Legend: 0-2 = LOW Risk; 3-4 = INTERMEDIATE Risk; 5-8 = HIGH Risk

## 2023-02-02 NOTE — H&P PST ADULT - ATTENDING COMMENTS
The risks, benefits, and alternatives including the option of doing nothing to a robotic, possible laparoscopic and possible open appendectomy, possible right hemicolectomy, and umbilical hernia repair were discussed.  The potential complications including but not limited to infection, bleeding, bowel injury and/or obstruction, post operative abscess and/or collection, incisional hernia, anastomotic leak, recurrent umbilical hernia, and chronic pain were discussed.  The patient admitted understanding and agrees to proceed as planned.

## 2023-02-02 NOTE — H&P PST ADULT - MUSCULOSKELETAL
negative ROM intact/no joint swelling/no joint erythema/no calf tenderness/normal gait/strength 5/5 bilateral upper extremities/strength 5/5 bilateral lower extremities

## 2023-02-17 ENCOUNTER — APPOINTMENT (OUTPATIENT)
Dept: SURGERY | Facility: HOSPITAL | Age: 58
End: 2023-02-17

## 2023-02-24 NOTE — ASU PATIENT PROFILE, ADULT - BLOOD TRANSFUSION, PREVIOUS, PROFILE
no
You can access the FollowMyHealth Patient Portal offered by [facility] by registering at the following website: [email or web address]. By joining [employer]’s FollowMyHealth portal, you will also be able to view your health information using other applications (apps) compatible with our system.

## 2023-02-24 NOTE — ASU PATIENT PROFILE, ADULT - NSICDXPASTMEDICALHX_GEN_ALL_CORE_FT
How Severe Is This Condition?: moderate PAST MEDICAL HISTORY:  Hyperlipidemia     Other specified diseases of appendix     Vertigo

## 2023-02-26 ENCOUNTER — TRANSCRIPTION ENCOUNTER (OUTPATIENT)
Age: 58
End: 2023-02-26

## 2023-02-27 ENCOUNTER — INPATIENT (INPATIENT)
Facility: HOSPITAL | Age: 58
LOS: 0 days | Discharge: HOME | DRG: 343 | End: 2023-02-27
Attending: SURGERY | Admitting: SURGERY
Payer: COMMERCIAL

## 2023-02-27 ENCOUNTER — APPOINTMENT (OUTPATIENT)
Dept: SURGICAL ONCOLOGY | Facility: HOSPITAL | Age: 58
End: 2023-02-27

## 2023-02-27 ENCOUNTER — APPOINTMENT (OUTPATIENT)
Dept: SURGERY | Facility: HOSPITAL | Age: 58
End: 2023-02-27
Payer: COMMERCIAL

## 2023-02-27 ENCOUNTER — RESULT REVIEW (OUTPATIENT)
Age: 58
End: 2023-02-27

## 2023-02-27 ENCOUNTER — TRANSCRIPTION ENCOUNTER (OUTPATIENT)
Age: 58
End: 2023-02-27

## 2023-02-27 VITALS
RESPIRATION RATE: 16 BRPM | SYSTOLIC BLOOD PRESSURE: 158 MMHG | DIASTOLIC BLOOD PRESSURE: 83 MMHG | WEIGHT: 304.24 LBS | TEMPERATURE: 98 F | HEIGHT: 77 IN | OXYGEN SATURATION: 100 % | HEART RATE: 59 BPM

## 2023-02-27 VITALS
RESPIRATION RATE: 14 BRPM | DIASTOLIC BLOOD PRESSURE: 88 MMHG | SYSTOLIC BLOOD PRESSURE: 140 MMHG | HEART RATE: 72 BPM | OXYGEN SATURATION: 98 %

## 2023-02-27 DIAGNOSIS — Z98.890 OTHER SPECIFIED POSTPROCEDURAL STATES: Chronic | ICD-10-CM

## 2023-02-27 DIAGNOSIS — K42.9 UMBILICAL HERNIA WITHOUT OBSTRUCTION OR GANGRENE: ICD-10-CM

## 2023-02-27 LAB — BLD GP AB SCN SERPL QL: SIGNIFICANT CHANGE UP

## 2023-02-27 PROCEDURE — 88304 TISSUE EXAM BY PATHOLOGIST: CPT | Mod: 26

## 2023-02-27 PROCEDURE — S2900 ROBOTIC SURGICAL SYSTEM: CPT | Mod: NC

## 2023-02-27 PROCEDURE — 36415 COLL VENOUS BLD VENIPUNCTURE: CPT

## 2023-02-27 PROCEDURE — 86900 BLOOD TYPING SEROLOGIC ABO: CPT

## 2023-02-27 PROCEDURE — C1889: CPT

## 2023-02-27 PROCEDURE — 44950 APPENDECTOMY: CPT | Mod: AS

## 2023-02-27 PROCEDURE — 86850 RBC ANTIBODY SCREEN: CPT

## 2023-02-27 PROCEDURE — 88304 TISSUE EXAM BY PATHOLOGIST: CPT

## 2023-02-27 PROCEDURE — 44950 APPENDECTOMY: CPT

## 2023-02-27 PROCEDURE — S2900: CPT

## 2023-02-27 PROCEDURE — 86901 BLOOD TYPING SEROLOGIC RH(D): CPT

## 2023-02-27 PROCEDURE — C9399: CPT

## 2023-02-27 DEVICE — STAPLER COVIDIEN TRI-STAPLE 45MM PURPLE RELOAD: Type: IMPLANTABLE DEVICE | Status: FUNCTIONAL

## 2023-02-27 DEVICE — STAPLER COVIDIEN TRI-STAPLE 60MM PURPLE RELOAD: Type: IMPLANTABLE DEVICE | Status: FUNCTIONAL

## 2023-02-27 RX ORDER — HYDROMORPHONE HYDROCHLORIDE 2 MG/ML
0.5 INJECTION INTRAMUSCULAR; INTRAVENOUS; SUBCUTANEOUS
Refills: 0 | Status: DISCONTINUED | OUTPATIENT
Start: 2023-02-27 | End: 2023-02-27

## 2023-02-27 RX ORDER — ACETAMINOPHEN 500 MG
1000 TABLET ORAL ONCE
Refills: 0 | Status: DISCONTINUED | OUTPATIENT
Start: 2023-02-27 | End: 2023-02-27

## 2023-02-27 RX ORDER — CELECOXIB 200 MG/1
400 CAPSULE ORAL ONCE
Refills: 0 | Status: COMPLETED | OUTPATIENT
Start: 2023-02-27 | End: 2023-02-27

## 2023-02-27 RX ORDER — ACETAMINOPHEN 500 MG
975 TABLET ORAL ONCE
Refills: 0 | Status: COMPLETED | OUTPATIENT
Start: 2023-02-27 | End: 2023-02-27

## 2023-02-27 RX ORDER — KETOROLAC TROMETHAMINE 30 MG/ML
30 SYRINGE (ML) INJECTION ONCE
Refills: 0 | Status: DISCONTINUED | OUTPATIENT
Start: 2023-02-27 | End: 2023-02-27

## 2023-02-27 RX ORDER — ATORVASTATIN CALCIUM 80 MG/1
1 TABLET, FILM COATED ORAL
Qty: 0 | Refills: 0 | DISCHARGE

## 2023-02-27 RX ORDER — ONDANSETRON 8 MG/1
4 TABLET, FILM COATED ORAL ONCE
Refills: 0 | Status: DISCONTINUED | OUTPATIENT
Start: 2023-02-27 | End: 2023-02-27

## 2023-02-27 RX ADMIN — Medication 975 MILLIGRAM(S): at 12:25

## 2023-02-27 RX ADMIN — CELECOXIB 400 MILLIGRAM(S): 200 CAPSULE ORAL at 12:25

## 2023-02-27 NOTE — ASU DISCHARGE PLAN (ADULT/PEDIATRIC) - MEDICATION INSTRUCTIONS
For pain control take Advil ( Ibuprofen ) 200 mg (1 tab) after breakfast, lunch, and dinner daily.  Can use Tylenol Extra Strength as directed on bottle in between Advil if needed for breakthrough pain.

## 2023-02-27 NOTE — ASU DISCHARGE PLAN (ADULT/PEDIATRIC) - CARE PROVIDER_API CALL
Kevin Prieto (MD)  Surgery  [address], [address] [place] [address]  Phone: [phone]  Fax: [phone]  Follow Up Time:

## 2023-02-27 NOTE — BRIEF OPERATIVE NOTE - NSICDXBRIEFPREOP_GEN_ALL_CORE_FT
PRE-OP DIAGNOSIS:  Mucocele, appendix 27-Feb-2023 17:32:55  Kevin Prieto  Umbilical hernia 27-Feb-2023 17:33:05  Kevin Prieto

## 2023-02-27 NOTE — BRIEF OPERATIVE NOTE - OPERATION/FINDINGS
Abdomen was prepped and draped in sterile fashion. Incision was made below umbilicus and fascia was directly visualized and incised and 12mm liu port was placed, followed by 2x8mm robotic ports under direct visualization. Appendix was dissected from surrounding mesentery and appendix and small section of cecum were removed via laparoscopic stapler with purple load.  Specimen was removed using endocatch. Ports were removed and umbilical hernia was repaired primarily and skin was closed

## 2023-02-27 NOTE — BRIEF OPERATIVE NOTE - NSICDXBRIEFPOSTOP_GEN_ALL_CORE_FT
POST-OP DIAGNOSIS:  Mucocele, appendix 27-Feb-2023 17:33:17  Kevin Prieto  Umbilical hernia 27-Feb-2023 17:33:27  Kevin Prieto

## 2023-02-27 NOTE — BRIEF OPERATIVE NOTE - NSICDXBRIEFPROCEDURE_GEN_ALL_CORE_FT
PROCEDURES:  Robot-assisted appendectomy 27-Feb-2023 17:07:15  Zoraida Velasquez  Partial cecectomy 27-Feb-2023 17:07:26  Zoraida Velasquez  Repair of umbilical hernia with lipectomy 27-Feb-2023 17:08:03  Zoraida Velasquez

## 2023-02-27 NOTE — ASU DISCHARGE PLAN (ADULT/PEDIATRIC) - NS MD DC FALL RISK RISK
For information on Fall & Injury Prevention, visit: [email or web address] OR  [email or web address] OR  https://www.cdc.gov/steadi/patient.html

## 2023-02-27 NOTE — ASU DISCHARGE PLAN (ADULT/PEDIATRIC) - CALL YOUR DOCTOR IF YOU HAVE ANY OF THE FOLLOWING:
severe abdominal pain and vomiting/Bleeding that does not stop/Swelling that gets worse/Pain not relieved by Medications/Fever greater than (need to indicate Fahrenheit or Celsius)/Wound/Surgical Site with redness, or foul smelling discharge or pus/Nausea and vomiting that does not stop/Unable to urinate

## 2023-02-27 NOTE — ASU DISCHARGE PLAN (ADULT/PEDIATRIC) - DO NOT TAKE THE STERI STRIPS OFF
From: Caprice Romero  To: Jody Kaplan  Sent: 11/17/2020 12:13 PM CST  Subject: Other    Good afternoon,    After thinking about it I would like to try taking an anxiety medication.     Also, I have been really tired/week the last few weeks. Could I take an iron supplement? If so how much should I take?    Thanks,  Caprice   Statement Selected

## 2023-03-01 PROBLEM — E78.5 HYPERLIPIDEMIA, UNSPECIFIED: Chronic | Status: ACTIVE | Noted: 2023-02-02

## 2023-03-01 PROBLEM — R42 DIZZINESS AND GIDDINESS: Chronic | Status: ACTIVE | Noted: 2023-02-02

## 2023-03-01 PROBLEM — K38.8 OTHER SPECIFIED DISEASES OF APPENDIX: Chronic | Status: ACTIVE | Noted: 2023-02-02

## 2023-03-03 LAB — SURGICAL PATHOLOGY STUDY: SIGNIFICANT CHANGE UP

## 2023-03-09 ENCOUNTER — APPOINTMENT (OUTPATIENT)
Dept: SURGERY | Facility: CLINIC | Age: 58
End: 2023-03-09
Payer: COMMERCIAL

## 2023-03-09 VITALS
HEART RATE: 76 BPM | BODY MASS INDEX: 35.42 KG/M2 | SYSTOLIC BLOOD PRESSURE: 133 MMHG | RESPIRATION RATE: 16 BRPM | HEIGHT: 77 IN | OXYGEN SATURATION: 96 % | TEMPERATURE: 97.1 F | WEIGHT: 300 LBS | DIASTOLIC BLOOD PRESSURE: 87 MMHG

## 2023-03-09 PROCEDURE — 99024 POSTOP FOLLOW-UP VISIT: CPT

## 2023-03-09 NOTE — PHYSICAL EXAM
[JVD] : no jugular venous distention  [Abdominal Masses] : No abdominal masses [Abdomen Tenderness] : ~T ~M No abdominal tenderness [Purpura] : no purpura  [Petechiae] : no petechiae [Skin Ulcer] : no ulcer [Skin Induration] : no induration [Alert] : alert [Oriented to Person] : oriented to person [Oriented to Place] : oriented to place [Oriented to Time] : oriented to time [Calm] : calm [de-identified] : non toxic, in no acute distress  [de-identified] : NC/AT PERRL EOMI no scleral icterus  [de-identified] : trachea midline, no gross mass  [de-identified] : no audible wheezing or stridor  [de-identified] : obese soft, remains with no localizing tenderness, no guarding, no rebound, no masses  [de-identified] : FROM of all extremities with no gross deformity or angulation, there remains a small umbilical hernia, no ventral hernia  [de-identified] : surgical incision sites are without infection [de-identified] : mood is calm

## 2023-03-09 NOTE — HISTORY OF PRESENT ILLNESS
[de-identified] : The patient returns to the office with no abdominal pain, nausea, or vomit.  Overall he states he is feeling well.  He had some periumbilical discomfort last week that has improved this week.  heavy meconium

## 2023-03-09 NOTE — ASSESSMENT
[FreeTextEntry1] : The patient is a 57 year old male who is stable following a robotic appendectomy done for a presumed mucocele.  The pathology reveals no mucocele, but appendiceal diverticulosis with intraluminal inflammation. The patient at this point will avoid heavy or strenuous activity and will follow up in 2 weeks or sooner should any problems or issues arise.

## 2023-03-22 ENCOUNTER — APPOINTMENT (OUTPATIENT)
Dept: SURGERY | Facility: CLINIC | Age: 58
End: 2023-03-22
Payer: COMMERCIAL

## 2023-03-22 VITALS
DIASTOLIC BLOOD PRESSURE: 82 MMHG | RESPIRATION RATE: 14 BRPM | HEIGHT: 77 IN | BODY MASS INDEX: 35.3 KG/M2 | HEART RATE: 69 BPM | OXYGEN SATURATION: 97 % | WEIGHT: 299 LBS | SYSTOLIC BLOOD PRESSURE: 125 MMHG | TEMPERATURE: 97.8 F

## 2023-03-22 DIAGNOSIS — K38.2: ICD-10-CM

## 2023-03-22 PROCEDURE — 99024 POSTOP FOLLOW-UP VISIT: CPT

## 2023-03-22 NOTE — ASSESSMENT
[FreeTextEntry1] : The patient is surgically stable and will follow up as needed from this point forward.

## 2023-03-22 NOTE — PHYSICAL EXAM
[JVD] : no jugular venous distention  [Abdominal Masses] : No abdominal masses [Abdomen Tenderness] : ~T ~M No abdominal tenderness [Purpura] : no purpura  [Petechiae] : no petechiae [Skin Ulcer] : no ulcer [Skin Induration] : no induration [Alert] : alert [Oriented to Person] : oriented to person [Oriented to Place] : oriented to place [Oriented to Time] : oriented to time [Calm] : calm [de-identified] : non toxic, in no acute distress  [de-identified] : NC/AT PERRL EOMI no scleral icterus  [de-identified] : trachea midline, no gross mass  [de-identified] : no audible wheezing or stridor  [de-identified] : obese soft, remains with no localizing tenderness, no guarding, no rebound, no masses  [de-identified] : FROM of all extremities with no gross deformity or angulation, there remains a small umbilical hernia, no ventral hernia  [de-identified] : surgical incision sites are without infection and healed well  [de-identified] : mood is calm

## 2023-03-22 NOTE — HISTORY OF PRESENT ILLNESS
[de-identified] : The patient returns to the office with no complaints. He is very pleased with his surgical results thus far.

## 2024-04-24 NOTE — ASU PATIENT PROFILE, ADULT - TOBACCO USE
NO:}  Bladder: {YES / NO:}  Urinary Catheter: {Urinary Catheter:573846627}   Colostomy/Ileostomy/Ileal Conduit: {YES / NO:}       Date of Last BM: ***  No intake or output data in the 24 hours ending 24 1850  No intake/output data recorded.    Safety Concerns:     { REX Safety Concerns:998452229}    Impairments/Disabilities:      { REX Impairments/Disabilities:281238987}    Nutrition Therapy:  Current Nutrition Therapy:   { REX Diet List:774868908}    Routes of Feeding: {[facility] DME Other Feedings:734591652}  Liquids: {Slp liquid thickness:82558}  Daily Fluid Restriction: {[facility] DME Yes amt example:949002338}  Last Modified Barium Swallow with Video (Video Swallowing Test): {Done Not Done Date:530347966}    Treatments at the Time of Hospital Discharge:   Respiratory Treatments: ***  Oxygen Therapy:  {Therapy; copd oxygen:29622}  Ventilator:    {[facility] Vent List:017299556}    Rehab Therapies: {THERAPEUTIC INTERVENTION:0759215341}  Weight Bearing Status/Restrictions: {[facility] Weight Bearin}  Other Medical Equipment (for information only, NOT a DME order):  {EQUIPMENT:018694125}  Other Treatments: ***    Patient's personal belongings (please select all that are sent with patient):  {[facility] DME Belongings:452854449}    RN SIGNATURE:  {Esignature:145749380}    CASE MANAGEMENT/SOCIAL WORK SECTION    Inpatient Status Date: ***    Readmission Risk Assessment Score:  Readmission Risk              Risk of Unplanned Readmission:  0           Discharging to Facility/ Agency   Name:   Address:  Phone:  Fax:    Dialysis Facility (if applicable)   Name:  Address:  Dialysis Schedule:  Phone:  Fax:    / signature: {Esignature:047426057}    PHYSICIAN SECTION    Prognosis: {Prognosis:4260874426}    Condition at Discharge: { Patient Condition:080369334}    Rehab Potential (if transferring to Rehab): {Prognosis:2538795426}    Recommended Labs or Other Treatments After Discharge:  Never smoker

## 2024-09-21 ENCOUNTER — NON-APPOINTMENT (OUTPATIENT)
Age: 59
End: 2024-09-21

## 2024-11-05 ENCOUNTER — OUTPATIENT (OUTPATIENT)
Dept: OUTPATIENT SERVICES | Facility: HOSPITAL | Age: 59
LOS: 1 days | Discharge: HOME | End: 2024-11-05
Payer: COMMERCIAL

## 2024-11-05 ENCOUNTER — TRANSCRIPTION ENCOUNTER (OUTPATIENT)
Age: 59
End: 2024-11-05

## 2024-11-05 ENCOUNTER — APPOINTMENT (OUTPATIENT)
Dept: WOUND CARE | Facility: HOSPITAL | Age: 59
End: 2024-11-05
Payer: COMMERCIAL

## 2024-11-05 ENCOUNTER — NON-APPOINTMENT (OUTPATIENT)
Age: 59
End: 2024-11-05

## 2024-11-05 VITALS
OXYGEN SATURATION: 98 % | DIASTOLIC BLOOD PRESSURE: 85 MMHG | TEMPERATURE: 98.1 F | BODY MASS INDEX: 35.42 KG/M2 | HEIGHT: 77 IN | HEART RATE: 80 BPM | RESPIRATION RATE: 14 BRPM | SYSTOLIC BLOOD PRESSURE: 126 MMHG | WEIGHT: 300 LBS

## 2024-11-05 DIAGNOSIS — L89.894 PRESSURE ULCER OF OTHER SITE, STAGE 4: ICD-10-CM

## 2024-11-05 DIAGNOSIS — Z86.0100 PERSONAL HISTORY OF COLON POLYPS, UNSPECIFIED: ICD-10-CM

## 2024-11-05 DIAGNOSIS — Z98.890 OTHER SPECIFIED POSTPROCEDURAL STATES: Chronic | ICD-10-CM

## 2024-11-05 DIAGNOSIS — Z87.81 PERSONAL HISTORY OF (HEALED) TRAUMATIC FRACTURE: ICD-10-CM

## 2024-11-05 DIAGNOSIS — Z80.0 FAMILY HISTORY OF MALIGNANT NEOPLASM OF DIGESTIVE ORGANS: ICD-10-CM

## 2024-11-05 DIAGNOSIS — S91.309D UNSPECIFIED OPEN WOUND, UNSPECIFIED FOOT, SUBSEQUENT ENCOUNTER: ICD-10-CM

## 2024-11-05 DIAGNOSIS — L97.516 NON-PRESSURE CHRONIC ULCER OF OTHER PART OF RIGHT FOOT WITH BONE INVOLVEMENT WITHOUT EVIDENCE OF NECROSIS: ICD-10-CM

## 2024-11-05 DIAGNOSIS — E78.5 HYPERLIPIDEMIA, UNSPECIFIED: ICD-10-CM

## 2024-11-05 PROCEDURE — 99203 OFFICE O/P NEW LOW 30 MIN: CPT

## 2024-11-05 PROCEDURE — 73630 X-RAY EXAM OF FOOT: CPT

## 2024-11-05 PROCEDURE — 87186 SC STD MICRODIL/AGAR DIL: CPT

## 2024-11-05 PROCEDURE — 87070 CULTURE OTHR SPECIMN AEROBIC: CPT

## 2024-11-05 PROCEDURE — 73630 X-RAY EXAM OF FOOT: CPT | Mod: 26,RT

## 2024-11-05 PROCEDURE — 87077 CULTURE AEROBIC IDENTIFY: CPT

## 2024-11-05 PROCEDURE — G0463: CPT

## 2024-11-05 RX ORDER — CIPROFLOXACIN HYDROCHLORIDE 500 MG/1
500 TABLET, FILM COATED ORAL TWICE DAILY
Qty: 14 | Refills: 0 | Status: ACTIVE | COMMUNITY
Start: 2024-11-05 | End: 1900-01-01

## 2024-11-08 LAB
-  CLINDAMYCIN: SIGNIFICANT CHANGE UP
-  ERYTHROMYCIN: SIGNIFICANT CHANGE UP
-  GENTAMICIN: SIGNIFICANT CHANGE UP
-  OXACILLIN: SIGNIFICANT CHANGE UP
-  PENICILLIN: SIGNIFICANT CHANGE UP
-  RIFAMPIN: SIGNIFICANT CHANGE UP
-  TETRACYCLINE: SIGNIFICANT CHANGE UP
-  TRIMETHOPRIM/SULFAMETHOXAZOLE: SIGNIFICANT CHANGE UP
-  VANCOMYCIN: SIGNIFICANT CHANGE UP
CULTURE RESULTS: ABNORMAL
METHOD TYPE: SIGNIFICANT CHANGE UP
ORGANISM # SPEC MICROSCOPIC CNT: SIGNIFICANT CHANGE UP
SPECIMEN SOURCE: SIGNIFICANT CHANGE UP

## 2024-11-11 ENCOUNTER — OUTPATIENT (OUTPATIENT)
Dept: OUTPATIENT SERVICES | Facility: HOSPITAL | Age: 59
LOS: 1 days | End: 2024-11-11
Payer: COMMERCIAL

## 2024-11-11 DIAGNOSIS — Z98.890 OTHER SPECIFIED POSTPROCEDURAL STATES: Chronic | ICD-10-CM

## 2024-11-11 DIAGNOSIS — L97.516 NON-PRESSURE CHRONIC ULCER OF OTHER PART OF RIGHT FOOT WITH BONE INVOLVEMENT WITHOUT EVIDENCE OF NECROSIS: ICD-10-CM

## 2024-11-11 PROCEDURE — 73718 MRI LOWER EXTREMITY W/O DYE: CPT

## 2024-11-11 PROCEDURE — 93923 UPR/LXTR ART STDY 3+ LVLS: CPT | Mod: 26

## 2024-11-11 PROCEDURE — 93923 UPR/LXTR ART STDY 3+ LVLS: CPT

## 2024-11-11 PROCEDURE — 73718 MRI LOWER EXTREMITY W/O DYE: CPT | Mod: 26,RT

## 2024-11-13 ENCOUNTER — OUTPATIENT (OUTPATIENT)
Dept: OUTPATIENT SERVICES | Facility: HOSPITAL | Age: 59
LOS: 1 days | Discharge: HOME | End: 2024-11-13
Payer: COMMERCIAL

## 2024-11-13 ENCOUNTER — APPOINTMENT (OUTPATIENT)
Dept: WOUND CARE | Facility: HOSPITAL | Age: 59
End: 2024-11-13
Payer: COMMERCIAL

## 2024-11-13 VITALS
SYSTOLIC BLOOD PRESSURE: 155 MMHG | DIASTOLIC BLOOD PRESSURE: 94 MMHG | TEMPERATURE: 98 F | BODY MASS INDEX: 35.42 KG/M2 | HEART RATE: 72 BPM | RESPIRATION RATE: 16 BRPM | WEIGHT: 300 LBS | OXYGEN SATURATION: 99 % | HEIGHT: 77 IN

## 2024-11-13 DIAGNOSIS — E78.5 HYPERLIPIDEMIA, UNSPECIFIED: ICD-10-CM

## 2024-11-13 DIAGNOSIS — Z98.890 OTHER SPECIFIED POSTPROCEDURAL STATES: Chronic | ICD-10-CM

## 2024-11-13 DIAGNOSIS — L89.894 PRESSURE ULCER OF OTHER SITE, STAGE 4: ICD-10-CM

## 2024-11-13 DIAGNOSIS — Z86.0100 PERSONAL HISTORY OF COLON POLYPS, UNSPECIFIED: ICD-10-CM

## 2024-11-13 DIAGNOSIS — L97.516 NON-PRESSURE CHRONIC ULCER OF OTHER PART OF RIGHT FOOT WITH BONE INVOLVEMENT WITHOUT EVIDENCE OF NECROSIS: ICD-10-CM

## 2024-11-13 DIAGNOSIS — Z87.81 PERSONAL HISTORY OF (HEALED) TRAUMATIC FRACTURE: ICD-10-CM

## 2024-11-13 DIAGNOSIS — Z80.0 FAMILY HISTORY OF MALIGNANT NEOPLASM OF DIGESTIVE ORGANS: ICD-10-CM

## 2024-11-13 PROCEDURE — G0463: CPT

## 2024-11-13 PROCEDURE — 99204 OFFICE O/P NEW MOD 45 MIN: CPT

## 2024-11-14 ENCOUNTER — APPOINTMENT (OUTPATIENT)
Dept: WOUND CARE | Facility: HOSPITAL | Age: 59
End: 2024-11-14
Payer: COMMERCIAL

## 2024-11-14 ENCOUNTER — OUTPATIENT (OUTPATIENT)
Dept: OUTPATIENT SERVICES | Facility: HOSPITAL | Age: 59
LOS: 1 days | Discharge: HOME | End: 2024-11-14
Payer: COMMERCIAL

## 2024-11-14 VITALS
SYSTOLIC BLOOD PRESSURE: 145 MMHG | DIASTOLIC BLOOD PRESSURE: 84 MMHG | RESPIRATION RATE: 18 BRPM | BODY MASS INDEX: 35.42 KG/M2 | WEIGHT: 300 LBS | OXYGEN SATURATION: 99 % | TEMPERATURE: 98.1 F | HEART RATE: 71 BPM | HEIGHT: 77 IN

## 2024-11-14 DIAGNOSIS — Z98.890 OTHER SPECIFIED POSTPROCEDURAL STATES: Chronic | ICD-10-CM

## 2024-11-14 DIAGNOSIS — S91.309A UNSPECIFIED OPEN WOUND, UNSPECIFIED FOOT, INITIAL ENCOUNTER: ICD-10-CM

## 2024-11-14 PROBLEM — L89.894 PRESSURE INJURY OF RIGHT FOOT, STAGE 4: Status: ACTIVE | Noted: 2024-11-05

## 2024-11-14 PROCEDURE — G0463: CPT

## 2024-11-14 PROCEDURE — 99213 OFFICE O/P EST LOW 20 MIN: CPT

## 2024-11-14 RX ORDER — AMOXICILLIN AND CLAVULANATE POTASSIUM 875; 125 MG/1; MG/1
875-125 TABLET, COATED ORAL
Qty: 14 | Refills: 0 | Status: COMPLETED | COMMUNITY
Start: 2024-11-14 | End: 2024-11-21

## 2024-11-15 ENCOUNTER — OUTPATIENT (OUTPATIENT)
Dept: OUTPATIENT SERVICES | Facility: HOSPITAL | Age: 59
LOS: 1 days | Discharge: HOME | End: 2024-11-15
Payer: COMMERCIAL

## 2024-11-15 ENCOUNTER — NON-APPOINTMENT (OUTPATIENT)
Age: 59
End: 2024-11-15

## 2024-11-15 ENCOUNTER — APPOINTMENT (OUTPATIENT)
Dept: WOUND CARE | Facility: HOSPITAL | Age: 59
End: 2024-11-15

## 2024-11-15 VITALS
TEMPERATURE: 97.9 F | BODY MASS INDEX: 35.42 KG/M2 | HEART RATE: 73 BPM | OXYGEN SATURATION: 97 % | RESPIRATION RATE: 18 BRPM | DIASTOLIC BLOOD PRESSURE: 84 MMHG | HEIGHT: 77 IN | SYSTOLIC BLOOD PRESSURE: 151 MMHG | WEIGHT: 300 LBS

## 2024-11-15 DIAGNOSIS — Z80.0 FAMILY HISTORY OF MALIGNANT NEOPLASM OF DIGESTIVE ORGANS: ICD-10-CM

## 2024-11-15 DIAGNOSIS — Z87.81 PERSONAL HISTORY OF (HEALED) TRAUMATIC FRACTURE: ICD-10-CM

## 2024-11-15 DIAGNOSIS — Z86.0100 PERSONAL HISTORY OF COLON POLYPS, UNSPECIFIED: ICD-10-CM

## 2024-11-15 DIAGNOSIS — Z98.890 OTHER SPECIFIED POSTPROCEDURAL STATES: Chronic | ICD-10-CM

## 2024-11-15 DIAGNOSIS — E78.5 HYPERLIPIDEMIA, UNSPECIFIED: ICD-10-CM

## 2024-11-15 DIAGNOSIS — L89.894 PRESSURE ULCER OF OTHER SITE, STAGE 4: ICD-10-CM

## 2024-11-15 DIAGNOSIS — M86.671 OTHER CHRONIC OSTEOMYELITIS, RIGHT ANKLE AND FOOT: ICD-10-CM

## 2024-11-15 DIAGNOSIS — S91.309A UNSPECIFIED OPEN WOUND, UNSPECIFIED FOOT, INITIAL ENCOUNTER: ICD-10-CM

## 2024-11-15 PROCEDURE — 20220 BONE BIOPSY TROCAR/NDL SUPFC: CPT | Mod: T5

## 2024-11-15 PROCEDURE — 88311 DECALCIFY TISSUE: CPT

## 2024-11-15 PROCEDURE — 88304 TISSUE EXAM BY PATHOLOGIST: CPT | Mod: 26

## 2024-11-15 PROCEDURE — 87070 CULTURE OTHR SPECIMN AEROBIC: CPT

## 2024-11-15 PROCEDURE — 88311 DECALCIFY TISSUE: CPT | Mod: 26

## 2024-11-15 PROCEDURE — 88304 TISSUE EXAM BY PATHOLOGIST: CPT

## 2024-11-15 PROCEDURE — 87075 CULTR BACTERIA EXCEPT BLOOD: CPT

## 2024-11-16 LAB
GRAM STN FLD: SIGNIFICANT CHANGE UP
SPECIMEN SOURCE: SIGNIFICANT CHANGE UP

## 2024-11-18 ENCOUNTER — OUTPATIENT (OUTPATIENT)
Dept: OUTPATIENT SERVICES | Facility: HOSPITAL | Age: 59
LOS: 1 days | Discharge: HOME | End: 2024-11-18
Payer: COMMERCIAL

## 2024-11-18 ENCOUNTER — APPOINTMENT (OUTPATIENT)
Dept: WOUND CARE | Facility: HOSPITAL | Age: 59
End: 2024-11-18
Payer: COMMERCIAL

## 2024-11-18 ENCOUNTER — NON-APPOINTMENT (OUTPATIENT)
Age: 59
End: 2024-11-18

## 2024-11-18 VITALS
HEART RATE: 68 BPM | HEIGHT: 77 IN | WEIGHT: 300 LBS | RESPIRATION RATE: 18 BRPM | TEMPERATURE: 97.8 F | DIASTOLIC BLOOD PRESSURE: 79 MMHG | SYSTOLIC BLOOD PRESSURE: 125 MMHG | OXYGEN SATURATION: 99 % | BODY MASS INDEX: 35.42 KG/M2

## 2024-11-18 DIAGNOSIS — L89.894 PRESSURE ULCER OF OTHER SITE, STAGE 4: ICD-10-CM

## 2024-11-18 DIAGNOSIS — M86.671 OTHER CHRONIC OSTEOMYELITIS, RIGHT ANKLE AND FOOT: ICD-10-CM

## 2024-11-18 DIAGNOSIS — Z98.890 OTHER SPECIFIED POSTPROCEDURAL STATES: Chronic | ICD-10-CM

## 2024-11-18 DIAGNOSIS — S91.309A UNSPECIFIED OPEN WOUND, UNSPECIFIED FOOT, INITIAL ENCOUNTER: ICD-10-CM

## 2024-11-18 PROCEDURE — ZZZZZ: CPT

## 2024-11-18 PROCEDURE — G0463: CPT

## 2024-11-21 ENCOUNTER — NON-APPOINTMENT (OUTPATIENT)
Age: 59
End: 2024-11-21

## 2024-11-21 ENCOUNTER — OUTPATIENT (OUTPATIENT)
Dept: OUTPATIENT SERVICES | Facility: HOSPITAL | Age: 59
LOS: 1 days | Discharge: HOME | End: 2024-11-21
Payer: COMMERCIAL

## 2024-11-21 ENCOUNTER — APPOINTMENT (OUTPATIENT)
Dept: WOUND CARE | Facility: HOSPITAL | Age: 59
End: 2024-11-21

## 2024-11-21 VITALS
TEMPERATURE: 98.5 F | HEART RATE: 63 BPM | SYSTOLIC BLOOD PRESSURE: 134 MMHG | WEIGHT: 300 LBS | DIASTOLIC BLOOD PRESSURE: 86 MMHG | RESPIRATION RATE: 18 BRPM | HEIGHT: 77 IN | OXYGEN SATURATION: 99 % | BODY MASS INDEX: 35.42 KG/M2

## 2024-11-21 DIAGNOSIS — L89.894 PRESSURE ULCER OF OTHER SITE, STAGE 4: ICD-10-CM

## 2024-11-21 DIAGNOSIS — Z98.890 OTHER SPECIFIED POSTPROCEDURAL STATES: Chronic | ICD-10-CM

## 2024-11-21 DIAGNOSIS — S91.309A UNSPECIFIED OPEN WOUND, UNSPECIFIED FOOT, INITIAL ENCOUNTER: ICD-10-CM

## 2024-11-21 LAB
CULTURE RESULTS: SIGNIFICANT CHANGE UP
SPECIMEN SOURCE: SIGNIFICANT CHANGE UP

## 2024-11-21 PROCEDURE — G0463: CPT

## 2024-11-21 PROCEDURE — 99203 OFFICE O/P NEW LOW 30 MIN: CPT

## 2024-11-25 ENCOUNTER — INPATIENT (INPATIENT)
Facility: HOSPITAL | Age: 59
LOS: 1 days | Discharge: HOME | DRG: 594 | End: 2024-11-27
Attending: FAMILY MEDICINE | Admitting: INTERNAL MEDICINE
Payer: COMMERCIAL

## 2024-11-25 VITALS
DIASTOLIC BLOOD PRESSURE: 83 MMHG | SYSTOLIC BLOOD PRESSURE: 131 MMHG | WEIGHT: 279.99 LBS | OXYGEN SATURATION: 98 % | HEIGHT: 77 IN | RESPIRATION RATE: 16 BRPM | HEART RATE: 77 BPM | TEMPERATURE: 98 F

## 2024-11-25 DIAGNOSIS — Z98.890 OTHER SPECIFIED POSTPROCEDURAL STATES: Chronic | ICD-10-CM

## 2024-11-25 DIAGNOSIS — Z29.9 ENCOUNTER FOR PROPHYLACTIC MEASURES, UNSPECIFIED: ICD-10-CM

## 2024-11-25 DIAGNOSIS — M86.179 OTHER ACUTE OSTEOMYELITIS, UNSPECIFIED ANKLE AND FOOT: ICD-10-CM

## 2024-11-25 DIAGNOSIS — L97.509 NON-PRESSURE CHRONIC ULCER OF OTHER PART OF UNSPECIFIED FOOT WITH UNSPECIFIED SEVERITY: ICD-10-CM

## 2024-11-25 DIAGNOSIS — E78.5 HYPERLIPIDEMIA, UNSPECIFIED: ICD-10-CM

## 2024-11-25 DIAGNOSIS — L89.894 PRESSURE ULCER OF OTHER SITE, STAGE 4: ICD-10-CM

## 2024-11-25 LAB
ALBUMIN SERPL ELPH-MCNC: 4.1 G/DL — SIGNIFICANT CHANGE UP (ref 3.3–5)
ALP SERPL-CCNC: 93 U/L — SIGNIFICANT CHANGE UP (ref 40–120)
ALT FLD-CCNC: 34 U/L — SIGNIFICANT CHANGE UP (ref 12–78)
ANION GAP SERPL CALC-SCNC: 5 MMOL/L — SIGNIFICANT CHANGE UP (ref 5–17)
APTT BLD: 34.1 SEC — SIGNIFICANT CHANGE UP (ref 24.5–35.6)
AST SERPL-CCNC: 23 U/L — SIGNIFICANT CHANGE UP (ref 15–37)
BASOPHILS # BLD AUTO: 0.02 K/UL — SIGNIFICANT CHANGE UP (ref 0–0.2)
BASOPHILS NFR BLD AUTO: 0.4 % — SIGNIFICANT CHANGE UP (ref 0–2)
BILIRUB SERPL-MCNC: 2.1 MG/DL — HIGH (ref 0.2–1.2)
BUN SERPL-MCNC: 18 MG/DL — SIGNIFICANT CHANGE UP (ref 7–23)
CALCIUM SERPL-MCNC: 9.1 MG/DL — SIGNIFICANT CHANGE UP (ref 8.5–10.1)
CHLORIDE SERPL-SCNC: 105 MMOL/L — SIGNIFICANT CHANGE UP (ref 96–108)
CO2 SERPL-SCNC: 29 MMOL/L — SIGNIFICANT CHANGE UP (ref 22–31)
CREAT SERPL-MCNC: 0.86 MG/DL — SIGNIFICANT CHANGE UP (ref 0.5–1.3)
EGFR: 100 ML/MIN/1.73M2 — SIGNIFICANT CHANGE UP
EOSINOPHIL # BLD AUTO: 0.04 K/UL — SIGNIFICANT CHANGE UP (ref 0–0.5)
EOSINOPHIL NFR BLD AUTO: 0.8 % — SIGNIFICANT CHANGE UP (ref 0–6)
ERYTHROCYTE [SEDIMENTATION RATE] IN BLOOD: 7 MM/HR — SIGNIFICANT CHANGE UP (ref 0–20)
GLUCOSE SERPL-MCNC: 87 MG/DL — SIGNIFICANT CHANGE UP (ref 70–99)
HCT VFR BLD CALC: 45 % — SIGNIFICANT CHANGE UP (ref 39–50)
HGB BLD-MCNC: 15.6 G/DL — SIGNIFICANT CHANGE UP (ref 13–17)
IMM GRANULOCYTES NFR BLD AUTO: 0.4 % — SIGNIFICANT CHANGE UP (ref 0–0.9)
INR BLD: 1.03 RATIO — SIGNIFICANT CHANGE UP (ref 0.85–1.16)
LACTATE SERPL-SCNC: 0.6 MMOL/L — LOW (ref 0.7–2)
LYMPHOCYTES # BLD AUTO: 1.52 K/UL — SIGNIFICANT CHANGE UP (ref 1–3.3)
LYMPHOCYTES # BLD AUTO: 30.2 % — SIGNIFICANT CHANGE UP (ref 13–44)
MCHC RBC-ENTMCNC: 29.1 PG — SIGNIFICANT CHANGE UP (ref 27–34)
MCHC RBC-ENTMCNC: 34.7 G/DL — SIGNIFICANT CHANGE UP (ref 32–36)
MCV RBC AUTO: 83.8 FL — SIGNIFICANT CHANGE UP (ref 80–100)
MONOCYTES # BLD AUTO: 0.46 K/UL — SIGNIFICANT CHANGE UP (ref 0–0.9)
MONOCYTES NFR BLD AUTO: 9.1 % — SIGNIFICANT CHANGE UP (ref 2–14)
NEUTROPHILS # BLD AUTO: 2.98 K/UL — SIGNIFICANT CHANGE UP (ref 1.8–7.4)
NEUTROPHILS NFR BLD AUTO: 59.1 % — SIGNIFICANT CHANGE UP (ref 43–77)
NRBC # BLD: 0 /100 WBCS — SIGNIFICANT CHANGE UP (ref 0–0)
PLATELET # BLD AUTO: 215 K/UL — SIGNIFICANT CHANGE UP (ref 150–400)
POTASSIUM SERPL-MCNC: 3.9 MMOL/L — SIGNIFICANT CHANGE UP (ref 3.5–5.3)
POTASSIUM SERPL-SCNC: 3.9 MMOL/L — SIGNIFICANT CHANGE UP (ref 3.5–5.3)
PROT SERPL-MCNC: 7.6 G/DL — SIGNIFICANT CHANGE UP (ref 6–8.3)
PROTHROM AB SERPL-ACNC: 12.1 SEC — SIGNIFICANT CHANGE UP (ref 9.9–13.4)
RBC # BLD: 5.37 M/UL — SIGNIFICANT CHANGE UP (ref 4.2–5.8)
RBC # FLD: 12.1 % — SIGNIFICANT CHANGE UP (ref 10.3–14.5)
SODIUM SERPL-SCNC: 139 MMOL/L — SIGNIFICANT CHANGE UP (ref 135–145)
WBC # BLD: 5.04 K/UL — SIGNIFICANT CHANGE UP (ref 3.8–10.5)
WBC # FLD AUTO: 5.04 K/UL — SIGNIFICANT CHANGE UP (ref 3.8–10.5)

## 2024-11-25 PROCEDURE — 73630 X-RAY EXAM OF FOOT: CPT | Mod: 26,RT

## 2024-11-25 PROCEDURE — 93010 ELECTROCARDIOGRAM REPORT: CPT

## 2024-11-25 PROCEDURE — 99253 IP/OBS CNSLTJ NEW/EST LOW 45: CPT | Mod: 57

## 2024-11-25 PROCEDURE — 99222 1ST HOSP IP/OBS MODERATE 55: CPT

## 2024-11-25 PROCEDURE — 99223 1ST HOSP IP/OBS HIGH 75: CPT | Mod: GC

## 2024-11-25 PROCEDURE — 71045 X-RAY EXAM CHEST 1 VIEW: CPT | Mod: 26

## 2024-11-25 PROCEDURE — 99285 EMERGENCY DEPT VISIT HI MDM: CPT

## 2024-11-25 RX ORDER — VANCOMYCIN HCL 900 MCG/MG
1550 POWDER (GRAM) MISCELLANEOUS ONCE
Refills: 0 | Status: DISCONTINUED | OUTPATIENT
Start: 2024-11-25 | End: 2024-11-25

## 2024-11-25 RX ORDER — ACETAMINOPHEN, DIPHENHYDRAMINE HCL, PHENYLEPHRINE HCL 325; 25; 5 MG/1; MG/1; MG/1
3 TABLET ORAL AT BEDTIME
Refills: 0 | Status: DISCONTINUED | OUTPATIENT
Start: 2024-11-25 | End: 2024-11-26

## 2024-11-25 RX ORDER — ACETAMINOPHEN 500MG 500 MG/1
650 TABLET, COATED ORAL EVERY 6 HOURS
Refills: 0 | Status: DISCONTINUED | OUTPATIENT
Start: 2024-11-25 | End: 2024-11-26

## 2024-11-25 RX ORDER — CEFAZOLIN SODIUM 10 G
2000 VIAL (EA) INJECTION EVERY 8 HOURS
Refills: 0 | Status: DISCONTINUED | OUTPATIENT
Start: 2024-11-25 | End: 2024-11-26

## 2024-11-25 RX ORDER — VANCOMYCIN HCL 900 MCG/MG
1500 POWDER (GRAM) MISCELLANEOUS ONCE
Refills: 0 | Status: COMPLETED | OUTPATIENT
Start: 2024-11-25 | End: 2024-11-25

## 2024-11-25 RX ORDER — SODIUM CHLORIDE 9 MG/ML
1000 INJECTION, SOLUTION INTRAMUSCULAR; INTRAVENOUS; SUBCUTANEOUS ONCE
Refills: 0 | Status: COMPLETED | OUTPATIENT
Start: 2024-11-25 | End: 2024-11-25

## 2024-11-25 RX ORDER — PIPERACILLIN SODIUM AND TAZOBACTAM SODIUM 4; .5 G/20ML; G/20ML
3.38 INJECTION, POWDER, LYOPHILIZED, FOR SOLUTION INTRAVENOUS ONCE
Refills: 0 | Status: COMPLETED | OUTPATIENT
Start: 2024-11-25 | End: 2024-11-25

## 2024-11-25 RX ADMIN — Medication 300 MILLIGRAM(S): at 14:50

## 2024-11-25 RX ADMIN — SODIUM CHLORIDE 1000 MILLILITER(S): 9 INJECTION, SOLUTION INTRAMUSCULAR; INTRAVENOUS; SUBCUTANEOUS at 12:13

## 2024-11-25 RX ADMIN — PIPERACILLIN SODIUM AND TAZOBACTAM SODIUM 3.38 GRAM(S): 4; .5 INJECTION, POWDER, LYOPHILIZED, FOR SOLUTION INTRAVENOUS at 15:49

## 2024-11-25 RX ADMIN — Medication 100 MILLIGRAM(S): at 21:14

## 2024-11-25 RX ADMIN — PIPERACILLIN SODIUM AND TAZOBACTAM SODIUM 200 GRAM(S): 4; .5 INJECTION, POWDER, LYOPHILIZED, FOR SOLUTION INTRAVENOUS at 12:14

## 2024-11-25 NOTE — CARE COORDINATION ASSESSMENT. - OTHER PERTINENT REFERRAL INFORMATION
spoke with patient at bedside .   Role of case management explained w verbalized understanding. Patient lives w his spouse in a PH which is a split. he was independent PTA w/o a device . He was going to the [facility] w Dr Stanton, no HCS. He is here for PICC placement and will require IVABX for positive OM to r 1st toe post surgery. Referral sent to [facility] [phone] care for insurance investigation. Patient is receptive . CM to follow

## 2024-11-25 NOTE — ED PROVIDER NOTE - OBJECTIVE STATEMENT
59-year-old male history of obesity, HLD sent in by Dr. Sevilla due to nonhealing right plantar hallux ulceration.  Was seen at [facility] urgent care 9/20/2024.  Was at [facility] the following day received 3 doses of IV antibiotics and discharged on 10 days of p.o. doxycycline.  Has had negative x-rays and pending negative venous Doppler for DVT.  Patient had bone biopsy and MRI showing osteomyelitis.  Is scheduled to have surgery of his right foot as well as a PICC line for IV antibiotics.  Denies other acute complaints at this time.  Not having significant pain to the area.

## 2024-11-25 NOTE — CARE COORDINATION ASSESSMENT. - NSPASTMEDSURGHISTORY_GEN_ALL_CORE_FT
PAST MEDICAL & SURGICAL HISTORY:  Other specified diseases of appendix      Hyperlipidemia      Vertigo      H/O colonoscopy      S/P foot surgery, right

## 2024-11-25 NOTE — PATIENT PROFILE ADULT - FALL HARM RISK - UNIVERSAL INTERVENTIONS
Bed in lowest position, wheels locked, appropriate side rails in place/Call bell, personal items and telephone in reach/Instruct patient to call for assistance before getting out of bed or chair/Non-slip footwear when patient is out of bed/[place] to call system/Physically safe environment - no spills, clutter or unnecessary equipment/Purposeful Proactive Rounding/Room/bathroom lighting operational, light cord in reach

## 2024-11-25 NOTE — CONSULT NOTE ADULT - ASSESSMENT
59-year-old male history of obesity, HLD, who was sent in by Dr. Sevilla due to nonhealing right plantar hallux ulceration with osteomyelitis on MRI. Plar for Crowell Arthroplasty and PICC line placement. Patient was admitted to [facility] in late September for foot ulcer infection and d/c'd on 10 day course of doxycycline after receiving 3 day IV ab treatment. His wound did not improve, he saw podiatry in early November Dr. Stanton who did a R 1st digit distal hallux bone biopsy and cxx grew MSSA and MRI and found osteomyelitis. He was also put on po augmentin.     Denies fever, chills, chest pain, palpitations, SOB, cough, abdominal pain.                Thank you for courtesy of this consult.     Will follow.  Discussed with the primary team.     Migdalia Arevalo MD  Division of Infectious Diseases   Cell [phone] between 8am and 6pm   After 6pm and weekends please call ID service at [phone].     55 minutes spent on total encounter assessing patient, examination, chart review, counseling and coordinating care by the attending physician/nurse/care manager.  59-year-old male history of obesity, HLD, R ankle fracture, who was sent in by Dr. Sevilla due to nonhealing right plantar hallux ulceration with osteomyelitis on MRI. Plar for Crowell Arthroplasty and PICC line placement. He underwent R 1st toe distal phalanx bone biopsy on 11/5, path showed mild chronic osteomyelitis and tissue culture no growth. Prior to that he had a wound culture which grew MSSA and group B strep. He had an MRI done 11/11 which showed Osteomyelitis of the distal phalanx of the first digit.    Plan for Crowell arthroplasty of right 1st toe in conjunction with PICC line and IV antibiotics x 6 weeks. He has no fevers and no leukocytosis.    #R 1st toe osteomyelitis  #R 1st toe ulcer    -can start cefazolin 2g IV q8h  -will need PICC line   -send OR cultures and path  -blood cultures collected in the ED    Thank you for courtesy of this consult.     Will follow.  Discussed with the primary team.     Migdalia Arevalo MD  Division of Infectious Diseases   Cell [phone] between 8am and 6pm   After 6pm and weekends please call ID service at [phone].     55 minutes spent on total encounter assessing patient, examination, chart review, counseling and coordinating care by the attending physician/nurse/care manager.

## 2024-11-25 NOTE — ED PROVIDER NOTE - MUSCULOSKELETAL, MLM
moving extremities well; right great toe with swelling, 2 sutures in place, ulceration to plantar aspect of great toe

## 2024-11-25 NOTE — H&P ADULT - PROBLEM SELECTOR PLAN 1
Bone biopsy and MRI by Dr. Stanton earlier this month showed OM of 1st R toe  Blood cultures x2 collected, f/u results  Zosyn, Vanco IV given  tylenol 650 mg PO Q6H PRN pain and/or fever  CBC, CMP f/u results AM   Dr. Sevilla to perform Crowell Arthroplasty 11/26  IR consulted for PICC Line placement  Dr. Arevalo consulted, f/u recs for antibiotics Bone biopsy and MRI by Dr. Stanton earlier this month showed OM of 1st R toe  Blood cultures x2 collected, f/u results  Zosyn given  tylenol 650 mg PO Q6H PRN pain and/or fever  CBC, CMP f/u results AM   Dr. Sevilla to perform Crowell Arthroplasty 11/26  IR consulted for PICC Line placement  Dr. Arevalo consulted, f/u recs for antibiotics  CXR EKG performed for pre op clearance  Continue IV Vanco Bone biopsy and MRI by Dr. Stanton earlier this month showed OM of 1st R toe  Wound cxx collected, Blood cultures x2 collected, f/u results  Zosyn given  Foot XR ordered, f/u results  tylenol 650 mg PO Q6H PRN pain and/or fever  CBC, CMP f/u results AM   Dr. Sevilla to perform Crowell Arthroplasty 11/26  IR consulted for PICC Line placement  Dr. Arevalo consulted, f/u recs for antibiotics  CXR EKG performed for pre op clearance  Continue IV Vanco

## 2024-11-25 NOTE — CHART NOTE - NSCHARTNOTEFT_GEN_A_CORE
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    Matias Moore, Attending Physician

## 2024-11-25 NOTE — H&P ADULT - HISTORY OF PRESENT ILLNESS
59-year-old male history of obesity, HLD sent in by Dr. Sevilla due to nonhealing right plantar hallux ulceration with osteomyelitis on MRI here for Crowell Arthroplasty and PICC line. Patient was admitted to [facility] in late September for foot ulcer infection and d/c'd on 10 day course of doxycycline after receiving 3 day IV ab treatment. His wound did not improve, he saw podiatry in early November Dr. Stanton who did a R 1st digit distal hallux bone biopsy and cxx grew MSSA and MRI and found osteomyelitis. He was also put on po augmentin. He was offered Crowell arthroplasty and 6 week PICC Line antibiotics which is what he is coming in for. Denies systemic symptoms.    Denies fever, chills, chest pain, palpitations, SOB, cough, abdominal pain.    ED Course:   Vitals: BP: , HR: , Temp: , RR: , SpO2: % on   Labs:  CBC wnl, CMP wnl  CXR EKG pending    Given IV zosyn, vanco fluids  Foot XR f/u results  Lactate 0.6       59-year-old male history of obesity, HLD sent in by Dr. Sevilla due to nonhealing right plantar hallux ulceration with osteomyelitis on MRI here for Crowell Arthroplasty and PICC line. Patient was admitted to [facility] in late September for foot ulcer infection and d/c'd on 10 day course of doxycycline after receiving 3 day IV ab treatment. His wound did not improve, he saw podiatry in early November Dr. Stanton who did a R 1st digit distal hallux bone biopsy and cxx grew MSSA and MRI and found osteomyelitis. He was also put on po augmentin. He was offered Crowell arthroplasty and 6 week PICC Line antibiotics which is what he is coming in for. Denies systemic symptoms.    Denies fever, chills, chest pain, palpitations, SOB, cough, abdominal pain.    ED Course:     131 mm Hg  83 mm Hg  77 /min  16 /min  97.7 Degrees F      Labs:  CBC wnl, CMP wnl  CXR EKG pending    Given IV zosyn, vanco fluids  Foot XR f/u results  Lactate 0.6

## 2024-11-25 NOTE — ED ADULT NURSE REASSESSMENT NOTE - COMFORT CARE
assisted to bathroom/ambulated to bathroom/plan of care explained/repositioned/wait time explained/warm blanket provided

## 2024-11-25 NOTE — CARE COORDINATION ASSESSMENT. - NSCAREPROVIDERS_GEN_ALL_CORE_FT
CARE PROVIDERS:  Accepting Physician: Matias Moore  Access Services: Nelly Chamorro  Administration: Santiago Paris  Administration: Madie Rollins  Administration: Sherlyn Houston  Admitting: Matias Moore  Attending: Matias Moore  Consultant: Migdalia Arevalo  Consultant: Irish Sevilla  Consultant: Cornel Rolle  ED ACP: Shanta Ribeiro  ED Attending: Bradley Gee  ED Nurse: Leigh Ann Jaramillo  Nurse: Khadijah Mejia  Nurse: Santiago Elder  Nurse: Isatu Moody  Nurse: Ana Jimenez  Primary Team: Poornima Mejia  Primary Team: Renan Montilla

## 2024-11-25 NOTE — ED PROVIDER NOTE - CLINICAL SUMMARY MEDICAL DECISION MAKING FREE TEXT BOX
Patient is a 59-year-old male with history of nonhealing ulcer to the right great toe.  Previously been on IV antibiotics as well as doxycycline.   he had a bone biopsy for possible osteomyelitis.  schedule have surgery on this toe as well as PICC line placed afterwards.  Denies fever, chills, chest pain, shortness of breath    General: well appearing, no acute distress, appropriate weight  Cardiac: heart RRR, no murmurs, rubs, gallops, pulses 2+ x4  Pulm: lungs CTA  GI: abdomen soft, nontender  Neuro: sensation intact, strength 5/5  Skin: warm, dry, no rash, laceration, abrasion, contusion. ulcer to the R great toes. no bleeding or discharge    will obtain labs, consult pod and admit patient.

## 2024-11-25 NOTE — CONSULT NOTE ADULT - PROBLEM SELECTOR RECOMMENDATION 9
Discussed diagnosis and treatment with patient  There is concern for right non-diabetic foot ulcer.  DSD applied today.  Rec IV antibiotics As Per ID  WBAT.  Patient will require medical and cardiac optimization prior to surgery.  Patient will be NPO after midnight for OR tomorrow, 11/26/2024, for Crowell arthroplasty.  Plan discussed with attending. Discussed diagnosis and treatment with patient  There is concern for right pressure ulcer   DSD applied today.  Rec IV antibiotics As Per ID  WBAT.  Patient will require medical and cardiac optimization prior to surgery.  Patient will be NPO after midnight for OR tomorrow, 11/26/2024, for Crowell arthroplasty.  Plan discussed with attending.

## 2024-11-25 NOTE — H&P ADULT - ATTENDING COMMENTS
59-year-old male history of obesity, HLD sent in by Dr. Sevilla due to nonhealing right plantar hallux ulceration with osteomyelitis on MRI here for Crowell Arthroplasty and PICC line    Pt planned for picc line with IR, IV vanco for osteo treatment, NPO after MN, planned for OR with crowell arthroplasty.  Medical clearance as above.    Matias Moore, Attending Physician

## 2024-11-25 NOTE — H&P ADULT - NSHPREVIEWOFSYSTEMS_GEN_ALL_CORE
REVIEW OF SYSTEMS:    CONSTITUTIONAL: No weakness, fevers or chills  HEENT:  No headache  RESPIRATORY: No cough, sob  CARDIOVASCULAR: No chest pain or palpitations  GASTROINTESTINAL: No abdominal pain, no nausea, vomiting  NEUROLOGICAL: No focal weakness   MSK: No myalgias  SKIN: No burning

## 2024-11-25 NOTE — CONSULT NOTE ADULT - ATTENDING COMMENTS
Patient was seen at the wound care clinic with (+) OM to the distal hallux, wound plantar proximal hallux.   Recommend IV antibiotics per infectious disease   Procedure discussed at length with patient regarding risks, complications, benefits, as well as pre/intra/post-operative expectations. Patient verbally consents to procedure and understood discussion regarding procedure and all questions were answered to patient's satisfaction at this time.  Patient scheduled for Crowell arthroplasty for tomorrow 11/26/24. Discussed with patient and family that patient is still high risk for more proximal amputation, loss of limb, sepsis and loss of life.   Request medical optimization and risk stratification  Please keep patient NPO after midnight for surgery scheduled tomorrow

## 2024-11-25 NOTE — CONSULT NOTE ADULT - SUBJECTIVE AND OBJECTIVE BOX
[facility]  INFECTIOUS DISEASES - Suki Bailey, [address], [place] [address]  Tel: [phone]     Fax: [phone]  =======================================================    N-[id number]  YENIFER BAPTISTE     CC: Patient is a 59y old  Male who presents with a chief complaint of Osteomyelitis (25 Nov 2024 13:06)    HPI:  59-year-old male history of obesity, HLD, who was sent in by Dr. Sevilla due to nonhealing right plantar hallux ulceration with osteomyelitis on MRI. Plar for Crowell Arthroplasty and PICC line placement. Patient was admitted to [facility] in late September for foot ulcer infection and d/c'd on 10 day course of doxycycline after receiving 3 day IV ab treatment. His wound did not improve, he saw podiatry in early November Dr. Stanton who did a R 1st digit distal hallux bone biopsy and cxx grew MSSA and MRI and found osteomyelitis. He was also put on po augmentin.     Denies fever, chills, chest pain, palpitations, SOB, cough, abdominal pain.          PAST MEDICAL & SURGICAL HISTORY:  Vertigo      Hyperlipidemia      Other specified diseases of appendix      S/P foot surgery, right      H/O colonoscopy          Social Hx:     FAMILY HISTORY:  FH: colon cancer (Mother)    Family history of early CAD (Father)        Allergies    No Known Allergies    Intolerances        Antibiotics:  MEDICATIONS  (STANDING):    MEDICATIONS  (PRN):  acetaminophen     Tablet .. 650 milliGRAM(s) Oral every 6 hours PRN Temp greater or equal to 38C (100.4F), Mild Pain (1 - 3)  melatonin 3 milliGRAM(s) Oral at bedtime PRN Insomnia       REVIEW OF SYSTEMS:  CONSTITUTIONAL:  No Fever or chills  HEENT:  No sore throat or runny nose.  CARDIOVASCULAR:  No chest pain or SOB.  RESPIRATORY:  No cough, shortness of breath  GASTROINTESTINAL:  No nausea, vomiting or diarrhea.  GENITOURINARY:  No dysuria  MUSCULOSKELETAL:  no joint aches, no muscle pain  SKIN:  + R 1st toe ulcer  NEUROLOGIC:  No headache or dizziness  PSYCHIATRIC:  No disorder of thought or mood.    Physical Exam:  Vital Signs Last 24 Hrs  T(C): 36.5 (25 Nov 2024 10:52), Max: 36.5 (25 Nov 2024 10:52)  T(F): 97.7 (25 Nov 2024 10:52), Max: 97.7 (25 Nov 2024 10:52)  HR: 77 (25 Nov 2024 10:52) (77 - 77)  BP: 131/83 (25 Nov 2024 10:52) (131/83 - 131/83)  BP(mean): --  RR: 16 (25 Nov 2024 10:52) (16 - 16)  SpO2: 98% (25 Nov 2024 10:52) (98% - 98%)    Parameters below as of 25 Nov 2024 10:52  Patient On (Oxygen Delivery Method): room air      Height (cm): 195.6 (11-25 @ 10:52)  Weight (kg): 127 (11-25 @ 10:52)  BMI (kg/m2): 33.2 (11-25 @ 10:52)  BSA (m2): 2.58 (11-25 @ 10:52)  GEN: NAD  HEENT: normocephalic and atraumatic.   NECK: Supple.   LUNGS: Normal respiratory effort  HEART: Regular rate and rhythm   ABDOMEN: Soft, nontender, and nondistended.    EXTREMITIES: No leg edema.  NEUROLOGIC: grossly intact.  PSYCHIATRIC: Appropriate affect .  SKIN: R 1st toe swelling, R 1st toe plantar ulcer, no active drainage    Labs:  11-25    139  |  105  |  18  ----------------------------<  87  3.9   |  29  |  0.86    Ca    9.1      25 Nov 2024 12:00    TPro  7.6  /  Alb  4.1  /  TBili  2.1[H]  /  DBili  x   /  AST  23  /  ALT  34  /  AlkPhos  93  11-25                          15.6   5.04  )-----------( 215      ( 25 Nov 2024 12:00 )             45.0     PT/INR - ( 25 Nov 2024 12:00 )   PT: 12.1 sec;   INR: 1.03 ratio         PTT - ( 25 Nov 2024 12:00 )  PTT:34.1 sec  Urinalysis Basic - ( 25 Nov 2024 12:00 )    Color: x / Appearance: x / SG: x / pH: x  Gluc: 87 mg/dL / Ketone: x  / Bili: x / Urobili: x   Blood: x / Protein: x / Nitrite: x   Leuk Esterase: x / RBC: x / WBC x   Sq Epi: x / Non Sq Epi: x / Bacteria: x      LIVER FUNCTIONS - ( 25 Nov 2024 12:00 )  Alb: 4.1 g/dL / Pro: 7.6 g/dL / ALK PHOS: 93 U/L / ALT: 34 U/L / AST: 23 U/L / GGT: x                     Sedimentation Rate, Erythrocyte: 7 mm/hr (11-25-24 @ 12:00)        RECENT CULTURES:  11-15 @ 15:30 Tissue     No growth at 5 days    Rare polymorphonuclear leukocytes seen per low power field  No organisms seen per oil power field            All imaging and other data have been reviewed.    < from: MR Foot No Cont, Right (11.11.24 @ 12:46) >  FINDINGS:    Marked osseous edema within the distal phalanx with loss of normal T1   fatty marrow with adjacent plantar ulcer which appears to extend to bone.    No acute fracture. Moderate osteoarthrosis of the second through fourth   TMT joints and mild osteoarthrosis involving the first and fifth TMT   joints and the first MTP joint.  No joint effusions. Mild to moderate   first metatarsophalangeal joint and hallux sesamoid interval   osteoarthritis.    PLANTAR PLATES:  Intact plantar plates without tear.    INTERMETATARSAL SPACES:  No intermetatarsal neuroma. Mild first through thirdintermetatarsal   bursitis.    TENDONS:  Visualized portions of the flexor, extensor, and peroneal tendons are   intact without tendinosis or tear. No tenosynovitis.    LISFRANC LIGAMENT:  Plantar (pC1-M2M3), interosseous (C1-M2), and dorsal bands intact.    MUSCLES:  Diffuse fatty atrophy of the intrinsic muscles of the foot.    SOFT TISSUES:  Small cutaneous defect at the plantar aspect of the right first digit at   the level of the base of the distal first phalanx. Loss of the normal fat   planes extending from the skin surface to the first interphalangeal   joint. Diffuse mild subcutaneous edema involving the right first digit.   Subcutaneous edema involving the dorsal soft tissues of the forefoot. No   discrete collection to suggest abscess, however evaluation is limited in   the absence of IV contrast.    IMPRESSION:  Osteomyelitis of the distal phalanx of the first digit with adjacent   plantar soft tissue ulcer.      < end of copied text >  
Chief Complaint : Patient is a 59y old  Male who presents with a chief complaint of Osteomyelitis (25 Nov 2024 14:57)    HPI : HPI:  59-year-old male history of obesity, HLD sent in by Dr. Sevilla due to nonhealing right plantar hallux ulceration with osteomyelitis on MRI here for Crowell Arthroplasty and PICC line. Patient was admitted to [facility] in late September for foot ulcer infection and d/c'd on 10 day course of doxycycline after receiving 3 day IV ab treatment. His wound did not improve, he saw podiatry in early November Dr. Stanton who did a R 1st digit distal hallux bone biopsy and cxx grew MSSA and MRI and found osteomyelitis. He was also put on po augmentin. He was offered Crowell arthroplasty and 6 week PICC Line antibiotics which is what he is coming in for. Denies systemic symptoms.    Denies fever, chills, chest pain, palpitations, SOB, cough, abdominal pain.    ED Course:     131 mm Hg  83 mm Hg  77 /min  16 /min  97.7 Degrees F      Labs:  CBC wnl, CMP wnl  CXR EKG pending    Given IV zosyn, vanco fluids  Foot XR f/u results  Lactate 0.6       (25 Nov 2024 13:06)      Patient admits to  (-) Fevers, (-) Chills, (-) Nausea, (-) Vomiting, (-) Shortness of Breath      PMH: Vertigo    Hyperlipidemia    Other specified diseases of appendix      PSH:S/P foot surgery, right    H/O colonoscopy        Allergies:No Known Allergies      Labs:                          15.6   5.04  )-----------( 215      ( 25 Nov 2024 12:00 )             45.0     WBC Trend  5.04 Date (11-25 @ 12:00)      Chem  11-25    139  |  105  |  18  ----------------------------<  87  3.9   |  29  |  0.86    Ca    9.1      25 Nov 2024 12:00    TPro  7.6  /  Alb  4.1  /  TBili  2.1[H]  /  DBili  x   /  AST  23  /  ALT  34  /  AlkPhos  93  11-25          T(F): 97.7 (11-25-24 @ 10:52), Max: 97.7 (11-25-24 @ 10:52)  HR: 77 (11-25-24 @ 10:52) (77 - 77)  BP: 131/83 (11-25-24 @ 10:52) (131/83 - 131/83)  RR: 16 (11-25-24 @ 10:52) (16 - 16)  SpO2: 98% (11-25-24 @ 10:52) (98% - 98%)  Wt(kg): --    O:   General: Pleasant  male NAD & AOX3.    Integument:  Skin warm, dry and supple bilateral.    Ulceration Right sub met 1  in nature, + hyperkeratotic border, wound base Fibrogranular, wound size (1.5 cm X1.5 cm X 0.2cm) - edema, - capo-wound erythema, + purulence, - fluctuance, - tracking/tunneling, - probe to bone.   Vascular: Dorsalis Pedis and Posterior Tibial pulses 2/4.  Capillary re-fill time less then 3 seconds digits 1-5 bilateral.    Neuro: Protective sensation intact to the level of the digits bilateral.  MSK: Muscle strength 5/5 all major muscle groups bilateral.

## 2024-11-25 NOTE — H&P ADULT - NSHPPHYSICALEXAM_GEN_ALL_CORE
VITALS:   T(C): 36.5 (11-25-24 @ 10:52), Max: 36.5 (11-25-24 @ 10:52)  HR: 77 (11-25-24 @ 10:52) (77 - 77)  BP: 131/83 (11-25-24 @ 10:52) (131/83 - 131/83)  RR: 16 (11-25-24 @ 10:52) (16 - 16)  SpO2: 98% (11-25-24 @ 10:52) (98% - 98%)    GENERAL: NAD, lying in bed comfortably  HEAD:  Atraumatic, normocephalic  EYES: Sclera clear  ENT: Moist mucous membranes  HEART: Regular rate and rhythm, no murmurs, rubs, or gallops  LUNGS: Unlabored respirations.  Clear to auscultation bilaterally, no crackles, wheezing, or rhonchi  ABDOMEN: Soft, nontender  EXTREMITIES: R toe ulcer on plantar surface, non-draining, otherwise no c/c/e  NERVOUS SYSTEM:  A&Ox3, no focal deficits   SKIN: Warm and dry

## 2024-11-25 NOTE — H&P ADULT - ASSESSMENT
59-year-old male history of obesity, HLD sent in by Dr. Sevilla due to nonhealing right plantar hallux ulceration with osteomyelitis on MRI here for Crowell Arthroplasty and PICC line

## 2024-11-26 ENCOUNTER — TRANSCRIPTION ENCOUNTER (OUTPATIENT)
Age: 59
End: 2024-11-26

## 2024-11-26 LAB
A1C WITH ESTIMATED AVERAGE GLUCOSE RESULT: 5.4 % — SIGNIFICANT CHANGE UP (ref 4–5.6)
ALBUMIN SERPL ELPH-MCNC: 3.6 G/DL — SIGNIFICANT CHANGE UP (ref 3.3–5)
ALP SERPL-CCNC: 84 U/L — SIGNIFICANT CHANGE UP (ref 40–120)
ALT FLD-CCNC: 28 U/L — SIGNIFICANT CHANGE UP (ref 12–78)
ANION GAP SERPL CALC-SCNC: 3 MMOL/L — LOW (ref 5–17)
AST SERPL-CCNC: 20 U/L — SIGNIFICANT CHANGE UP (ref 15–37)
BASOPHILS # BLD AUTO: 0.02 K/UL — SIGNIFICANT CHANGE UP (ref 0–0.2)
BASOPHILS NFR BLD AUTO: 0.4 % — SIGNIFICANT CHANGE UP (ref 0–2)
BILIRUB SERPL-MCNC: 2.4 MG/DL — HIGH (ref 0.2–1.2)
BUN SERPL-MCNC: 15 MG/DL — SIGNIFICANT CHANGE UP (ref 7–23)
CALCIUM SERPL-MCNC: 8.8 MG/DL — SIGNIFICANT CHANGE UP (ref 8.5–10.1)
CHLORIDE SERPL-SCNC: 106 MMOL/L — SIGNIFICANT CHANGE UP (ref 96–108)
CHOLEST SERPL-MCNC: 140 MG/DL — SIGNIFICANT CHANGE UP
CO2 SERPL-SCNC: 31 MMOL/L — SIGNIFICANT CHANGE UP (ref 22–31)
CREAT SERPL-MCNC: 0.92 MG/DL — SIGNIFICANT CHANGE UP (ref 0.5–1.3)
CRP SERPL-MCNC: 3 MG/L — SIGNIFICANT CHANGE UP
EGFR: 96 ML/MIN/1.73M2 — SIGNIFICANT CHANGE UP
EOSINOPHIL # BLD AUTO: 0.05 K/UL — SIGNIFICANT CHANGE UP (ref 0–0.5)
EOSINOPHIL NFR BLD AUTO: 1.1 % — SIGNIFICANT CHANGE UP (ref 0–6)
ESTIMATED AVERAGE GLUCOSE: 108 MG/DL — SIGNIFICANT CHANGE UP (ref 68–114)
GLUCOSE SERPL-MCNC: 96 MG/DL — SIGNIFICANT CHANGE UP (ref 70–99)
HCT VFR BLD CALC: 42 % — SIGNIFICANT CHANGE UP (ref 39–50)
HDLC SERPL-MCNC: 34 MG/DL — LOW
HGB BLD-MCNC: 14.4 G/DL — SIGNIFICANT CHANGE UP (ref 13–17)
IMM GRANULOCYTES NFR BLD AUTO: 0.4 % — SIGNIFICANT CHANGE UP (ref 0–0.9)
LIPID PNL WITH DIRECT LDL SERPL: 87 MG/DL — SIGNIFICANT CHANGE UP
LYMPHOCYTES # BLD AUTO: 1.52 K/UL — SIGNIFICANT CHANGE UP (ref 1–3.3)
LYMPHOCYTES # BLD AUTO: 33.2 % — SIGNIFICANT CHANGE UP (ref 13–44)
MCHC RBC-ENTMCNC: 29.1 PG — SIGNIFICANT CHANGE UP (ref 27–34)
MCHC RBC-ENTMCNC: 34.3 G/DL — SIGNIFICANT CHANGE UP (ref 32–36)
MCV RBC AUTO: 84.8 FL — SIGNIFICANT CHANGE UP (ref 80–100)
MONOCYTES # BLD AUTO: 0.51 K/UL — SIGNIFICANT CHANGE UP (ref 0–0.9)
MONOCYTES NFR BLD AUTO: 11.1 % — SIGNIFICANT CHANGE UP (ref 2–14)
NEUTROPHILS # BLD AUTO: 2.46 K/UL — SIGNIFICANT CHANGE UP (ref 1.8–7.4)
NEUTROPHILS NFR BLD AUTO: 53.8 % — SIGNIFICANT CHANGE UP (ref 43–77)
NON HDL CHOLESTEROL: 106 MG/DL — SIGNIFICANT CHANGE UP
NRBC # BLD: 0 /100 WBCS — SIGNIFICANT CHANGE UP (ref 0–0)
PLATELET # BLD AUTO: 183 K/UL — SIGNIFICANT CHANGE UP (ref 150–400)
POTASSIUM SERPL-MCNC: 3.6 MMOL/L — SIGNIFICANT CHANGE UP (ref 3.5–5.3)
POTASSIUM SERPL-SCNC: 3.6 MMOL/L — SIGNIFICANT CHANGE UP (ref 3.5–5.3)
PROT SERPL-MCNC: 6.7 G/DL — SIGNIFICANT CHANGE UP (ref 6–8.3)
RBC # BLD: 4.95 M/UL — SIGNIFICANT CHANGE UP (ref 4.2–5.8)
RBC # FLD: 12 % — SIGNIFICANT CHANGE UP (ref 10.3–14.5)
SODIUM SERPL-SCNC: 140 MMOL/L — SIGNIFICANT CHANGE UP (ref 135–145)
TRIGL SERPL-MCNC: 99 MG/DL — SIGNIFICANT CHANGE UP
WBC # BLD: 4.58 K/UL — SIGNIFICANT CHANGE UP (ref 3.8–10.5)
WBC # FLD AUTO: 4.58 K/UL — SIGNIFICANT CHANGE UP (ref 3.8–10.5)

## 2024-11-26 PROCEDURE — 36573 INSJ PICC RS&I 5 YR+: CPT

## 2024-11-26 PROCEDURE — 88311 DECALCIFY TISSUE: CPT | Mod: 26

## 2024-11-26 PROCEDURE — 77001 FLUOROGUIDE FOR VEIN DEVICE: CPT | Mod: 26,59

## 2024-11-26 PROCEDURE — 73630 X-RAY EXAM OF FOOT: CPT | Mod: 26,RT

## 2024-11-26 PROCEDURE — 99232 SBSQ HOSP IP/OBS MODERATE 35: CPT

## 2024-11-26 PROCEDURE — 88304 TISSUE EXAM BY PATHOLOGIST: CPT | Mod: 26

## 2024-11-26 PROCEDURE — 36000 PLACE NEEDLE IN VEIN: CPT | Mod: 59

## 2024-11-26 PROCEDURE — 76937 US GUIDE VASCULAR ACCESS: CPT | Mod: 26,59

## 2024-11-26 PROCEDURE — 99233 SBSQ HOSP IP/OBS HIGH 50: CPT

## 2024-11-26 PROCEDURE — 28292 COR HLX VLGS RSC PRX PHLX BS: CPT | Mod: RT

## 2024-11-26 RX ORDER — SODIUM CHLORIDE 9 MG/ML
10 INJECTION, SOLUTION INTRAMUSCULAR; INTRAVENOUS; SUBCUTANEOUS
Refills: 0 | Status: DISCONTINUED | OUTPATIENT
Start: 2024-11-26 | End: 2024-11-27

## 2024-11-26 RX ORDER — CHLORHEXIDINE GLUCONATE 1.2 MG/ML
1 RINSE ORAL
Refills: 0 | Status: DISCONTINUED | OUTPATIENT
Start: 2024-11-26 | End: 2024-11-26

## 2024-11-26 RX ORDER — SODIUM CHLORIDE 9 MG/ML
10 INJECTION, SOLUTION INTRAMUSCULAR; INTRAVENOUS; SUBCUTANEOUS
Refills: 0 | Status: DISCONTINUED | OUTPATIENT
Start: 2024-11-26 | End: 2024-11-26

## 2024-11-26 RX ORDER — 0.9 % SODIUM CHLORIDE 0.9 %
1000 INTRAVENOUS SOLUTION INTRAVENOUS
Refills: 0 | Status: DISCONTINUED | OUTPATIENT
Start: 2024-11-26 | End: 2024-11-26

## 2024-11-26 RX ORDER — ACETAMINOPHEN 500MG 500 MG/1
650 TABLET, COATED ORAL EVERY 6 HOURS
Refills: 0 | Status: DISCONTINUED | OUTPATIENT
Start: 2024-11-26 | End: 2024-11-27

## 2024-11-26 RX ORDER — ONDANSETRON HYDROCHLORIDE 4 MG/1
4 TABLET, FILM COATED ORAL ONCE
Refills: 0 | Status: DISCONTINUED | OUTPATIENT
Start: 2024-11-26 | End: 2024-11-26

## 2024-11-26 RX ORDER — HYDROMORPHONE HYDROCHLORIDE 2 MG/1
0.5 TABLET ORAL
Refills: 0 | Status: DISCONTINUED | OUTPATIENT
Start: 2024-11-26 | End: 2024-11-26

## 2024-11-26 RX ORDER — OXYCODONE HYDROCHLORIDE 30 MG/1
5 TABLET ORAL ONCE
Refills: 0 | Status: DISCONTINUED | OUTPATIENT
Start: 2024-11-26 | End: 2024-11-26

## 2024-11-26 RX ORDER — ACETAMINOPHEN, DIPHENHYDRAMINE HCL, PHENYLEPHRINE HCL 325; 25; 5 MG/1; MG/1; MG/1
3 TABLET ORAL AT BEDTIME
Refills: 0 | Status: DISCONTINUED | OUTPATIENT
Start: 2024-11-26 | End: 2024-11-27

## 2024-11-26 RX ORDER — CHLORHEXIDINE GLUCONATE 1.2 MG/ML
1 RINSE ORAL
Refills: 0 | Status: DISCONTINUED | OUTPATIENT
Start: 2024-11-26 | End: 2024-11-27

## 2024-11-26 RX ORDER — CEFAZOLIN SODIUM 10 G
2000 VIAL (EA) INJECTION EVERY 8 HOURS
Refills: 0 | Status: DISCONTINUED | OUTPATIENT
Start: 2024-11-26 | End: 2024-11-27

## 2024-11-26 RX ADMIN — Medication 100 MILLIGRAM(S): at 21:15

## 2024-11-26 RX ADMIN — Medication 100 MILLIGRAM(S): at 13:31

## 2024-11-26 RX ADMIN — CHLORHEXIDINE GLUCONATE 1 APPLICATION(S): 1.2 RINSE ORAL at 14:26

## 2024-11-26 RX ADMIN — Medication 75 MILLILITER(S): at 18:10

## 2024-11-26 RX ADMIN — Medication 100 MILLIGRAM(S): at 06:52

## 2024-11-26 NOTE — DISCHARGE NOTE NURSING/CASE MANAGEMENT/SOCIAL WORK - NSSCNAMETXT_GEN_ALL_CORE
[facility] - [phone] -- Home Infusion Services  [facility] - [phone] -- Visiting nurse services

## 2024-11-26 NOTE — PROGRESS NOTE ADULT - TIME BILLING
Reviewing chart notes and data, face to face time counseling the patient, coordinating care with SW/CM at [facility].

## 2024-11-26 NOTE — DISCHARGE NOTE NURSING/CASE MANAGEMENT/SOCIAL WORK - PATIENT PORTAL LINK FT
You can access the FollowMyHealth Patient Portal offered by [facility] by registering at the following website: [email or web address]. By joining [facility]’s FollowMyHealth portal, you will also be able to view your health information using other applications (apps) compatible with our system.

## 2024-11-26 NOTE — DISCHARGE NOTE NURSING/CASE MANAGEMENT/SOCIAL WORK - NSDPDISTO_GEN_ALL_CORE
Taking PNVs, no concerns. Had been on sertraline for anxiety, and has stopped taking them, makes her not feel like herself. Does not want to be on medication at this time. No vaginal bleeding or fluid leaking. Having some round ligament pain. No headaches or vision changes. No nausea/vomiting, some heartburn. Discussed that she can take tums. She will need RhoGam at 28 weeks so she will do all of her lab work and Tdap at 28 weeks.    Electronically signed by:  Faisal Moreira M.D.  5/7/2019         Home with home care

## 2024-11-26 NOTE — CASE MANAGEMENT PROGRESS NOTE - NSCMPROGRESSNOTE_GEN_ALL_CORE
Patient is recommended for IV abx for 6 weeks. S/p PICC line placement. Patient has been accepted by [facility] for home IV antibiotics. Patient confirmed with CM that he is willing, and able to perform home infusion therapy. He has a copay of $30.00 per week, and he is in agreement to out of pocket cost. Anticipated for OR today for podiatric surgery. Anticipated DC plan for DC tomorrow with home infusion services; will follow after surgery for any wound care/[facility]HA needs. CM will continue to follow.

## 2024-11-26 NOTE — DISCHARGE NOTE NURSING/CASE MANAGEMENT/SOCIAL WORK - FINANCIAL ASSISTANCE
[facility] provides services at a reduced cost to those who are determined to be eligible through [facility]’s financial assistance program. Information regarding [facility]’s financial assistance program can be found by going to [email or web address] or by calling [phone].

## 2024-11-26 NOTE — ED ADULT NURSE REASSESSMENT NOTE - NS ED NURSE REASSESS COMMENT FT1
pt arrived into IRD for PICC placement. dressing dry and intact. report given to Zuleyka FERDERICK
pt c/o flushed feeling to forehead skin slightly red and redness noted to jaw denies difficulty swallowing Dr Ames called and made aware
received pt in er is alert oriented has a small ulceration to great toe on right foot pt denies pain no drainage noted from toe area dressed pt is able to get oob walks to the bathroom iv infusing 20 angio right arm admitted to the hospital

## 2024-11-26 NOTE — ASU PREOP CHECKLIST - TEMPERATURE IN FAHRENHEIT (DEGREES F)
Chronic, controlled. Latest blood pressure and vitals reviewed-     Temp:  [97.6 °F (36.4 °C)]   Pulse:  []   Resp:  [12-22]   BP: (105-173)/(55-77)   SpO2:  [95 %-100 %] .   Home meds for hypertension were reviewed and noted below.   Hypertension Medications               amLODIPine (NORVASC) 10 MG tablet Take 10 mg by mouth once daily.    cloNIDine 0.1 mg/24 hr td ptwk (CATAPRES) 0.1 mg/24 hr 1 patch every 7 days.    lisinopriL (PRINIVIL,ZESTRIL) 20 MG tablet Take 20 mg by mouth 2 (two) times daily.            While in the hospital, will manage blood pressure as follows; Continue home antihypertensive regimen    Will utilize p.r.n. blood pressure medication only if patient's blood pressure greater than 180/110 and he develops symptoms such as worsening chest pain or shortness of breath.   97.9

## 2024-11-27 ENCOUNTER — TRANSCRIPTION ENCOUNTER (OUTPATIENT)
Age: 59
End: 2024-11-27

## 2024-11-27 VITALS
SYSTOLIC BLOOD PRESSURE: 116 MMHG | DIASTOLIC BLOOD PRESSURE: 71 MMHG | HEART RATE: 61 BPM | RESPIRATION RATE: 16 BRPM | TEMPERATURE: 97 F | WEIGHT: 286.16 LBS | OXYGEN SATURATION: 93 %

## 2024-11-27 LAB
ANION GAP SERPL CALC-SCNC: 9 MMOL/L — SIGNIFICANT CHANGE UP (ref 5–17)
BUN SERPL-MCNC: 17 MG/DL — SIGNIFICANT CHANGE UP (ref 7–23)
CALCIUM SERPL-MCNC: 9.5 MG/DL — SIGNIFICANT CHANGE UP (ref 8.5–10.1)
CHLORIDE SERPL-SCNC: 104 MMOL/L — SIGNIFICANT CHANGE UP (ref 96–108)
CO2 SERPL-SCNC: 24 MMOL/L — SIGNIFICANT CHANGE UP (ref 22–31)
CREAT SERPL-MCNC: 0.95 MG/DL — SIGNIFICANT CHANGE UP (ref 0.5–1.3)
EGFR: 93 ML/MIN/1.73M2 — SIGNIFICANT CHANGE UP
GLUCOSE SERPL-MCNC: 179 MG/DL — HIGH (ref 70–99)
GRAM STN FLD: SIGNIFICANT CHANGE UP
HCT VFR BLD CALC: 42.8 % — SIGNIFICANT CHANGE UP (ref 39–50)
HGB BLD-MCNC: 14.8 G/DL — SIGNIFICANT CHANGE UP (ref 13–17)
MCHC RBC-ENTMCNC: 28.6 PG — SIGNIFICANT CHANGE UP (ref 27–34)
MCHC RBC-ENTMCNC: 34.6 G/DL — SIGNIFICANT CHANGE UP (ref 32–36)
MCV RBC AUTO: 82.6 FL — SIGNIFICANT CHANGE UP (ref 80–100)
NRBC # BLD: 0 /100 WBCS — SIGNIFICANT CHANGE UP (ref 0–0)
PLATELET # BLD AUTO: 218 K/UL — SIGNIFICANT CHANGE UP (ref 150–400)
POTASSIUM SERPL-MCNC: 4 MMOL/L — SIGNIFICANT CHANGE UP (ref 3.5–5.3)
POTASSIUM SERPL-SCNC: 4 MMOL/L — SIGNIFICANT CHANGE UP (ref 3.5–5.3)
RBC # BLD: 5.18 M/UL — SIGNIFICANT CHANGE UP (ref 4.2–5.8)
RBC # FLD: 11.9 % — SIGNIFICANT CHANGE UP (ref 10.3–14.5)
SODIUM SERPL-SCNC: 137 MMOL/L — SIGNIFICANT CHANGE UP (ref 135–145)
SPECIMEN SOURCE: SIGNIFICANT CHANGE UP
WBC # BLD: 13.22 K/UL — HIGH (ref 3.8–10.5)
WBC # FLD AUTO: 13.22 K/UL — HIGH (ref 3.8–10.5)

## 2024-11-27 PROCEDURE — 83605 ASSAY OF LACTIC ACID: CPT

## 2024-11-27 PROCEDURE — 73630 X-RAY EXAM OF FOOT: CPT

## 2024-11-27 PROCEDURE — 87075 CULTR BACTERIA EXCEPT BLOOD: CPT

## 2024-11-27 PROCEDURE — 87070 CULTURE OTHR SPECIMN AEROBIC: CPT

## 2024-11-27 PROCEDURE — 85025 COMPLETE CBC W/AUTO DIFF WBC: CPT

## 2024-11-27 PROCEDURE — 71045 X-RAY EXAM CHEST 1 VIEW: CPT

## 2024-11-27 PROCEDURE — 36415 COLL VENOUS BLD VENIPUNCTURE: CPT

## 2024-11-27 PROCEDURE — 99024 POSTOP FOLLOW-UP VISIT: CPT

## 2024-11-27 PROCEDURE — 85027 COMPLETE CBC AUTOMATED: CPT

## 2024-11-27 PROCEDURE — 80053 COMPREHEN METABOLIC PANEL: CPT

## 2024-11-27 PROCEDURE — 96365 THER/PROPH/DIAG IV INF INIT: CPT

## 2024-11-27 PROCEDURE — 80048 BASIC METABOLIC PNL TOTAL CA: CPT

## 2024-11-27 PROCEDURE — C1751: CPT

## 2024-11-27 PROCEDURE — 76937 US GUIDE VASCULAR ACCESS: CPT

## 2024-11-27 PROCEDURE — 88311 DECALCIFY TISSUE: CPT

## 2024-11-27 PROCEDURE — 83036 HEMOGLOBIN GLYCOSYLATED A1C: CPT

## 2024-11-27 PROCEDURE — 88304 TISSUE EXAM BY PATHOLOGIST: CPT

## 2024-11-27 PROCEDURE — 99239 HOSP IP/OBS DSCHRG MGMT >30: CPT

## 2024-11-27 PROCEDURE — 86140 C-REACTIVE PROTEIN: CPT

## 2024-11-27 PROCEDURE — 87040 BLOOD CULTURE FOR BACTERIA: CPT

## 2024-11-27 PROCEDURE — 80061 LIPID PANEL: CPT

## 2024-11-27 PROCEDURE — 99285 EMERGENCY DEPT VISIT HI MDM: CPT

## 2024-11-27 PROCEDURE — 85652 RBC SED RATE AUTOMATED: CPT

## 2024-11-27 PROCEDURE — 93005 ELECTROCARDIOGRAM TRACING: CPT

## 2024-11-27 PROCEDURE — 36573 INSJ PICC RS&I 5 YR+: CPT

## 2024-11-27 PROCEDURE — 85610 PROTHROMBIN TIME: CPT

## 2024-11-27 PROCEDURE — 85730 THROMBOPLASTIN TIME PARTIAL: CPT

## 2024-11-27 RX ORDER — CEFAZOLIN SODIUM 10 G
2 VIAL (EA) INJECTION
Qty: 0 | Refills: 0 | DISCHARGE
Start: 2024-11-27

## 2024-11-27 RX ADMIN — CHLORHEXIDINE GLUCONATE 1 APPLICATION(S): 1.2 RINSE ORAL at 05:28

## 2024-11-27 RX ADMIN — Medication 100 MILLIGRAM(S): at 05:28

## 2024-11-27 NOTE — DISCHARGE NOTE PROVIDER - CARE PROVIDER_API CALL
Irish Sevilla [facility]  Podiatric Medicine and Surgery  [address] [place] [id number]  Phone: [phone]  Fax: [phone]  Follow Up Time:

## 2024-11-27 NOTE — DISCHARGE NOTE PROVIDER - HOSPITAL COURSE
ADMISSION DATE:  11-25-24    ---  FROM ADMISSION H+P:   HPI:  59-year-old male history of obesity, HLD sent in by Dr. Sevilla due to nonhealing right plantar hallux ulceration with osteomyelitis on MRI here for Crowell Arthroplasty and PICC line. Patient was admitted to [facility] in late September for foot ulcer infection and d/c'd on 10 day course of doxycycline after receiving 3 day IV ab treatment. His wound did not improve, he saw podiatry in early November Dr. Stanton who did a R 1st digit distal hallux bone biopsy and cxx grew MSSA and MRI and found osteomyelitis. He was also put on po augmentin. He was offered Crowell arthroplasty and 6 week PICC Line antibiotics which is what he is coming in for. Denies systemic symptoms.    Denies fever, chills, chest pain, palpitations, SOB, cough, abdominal pain.    ED Course:     131 mm Hg  83 mm Hg  77 /min  16 /min  97.7 Degrees F      Labs:  CBC wnl, CMP wnl  CXR EKG pending    Given IV zosyn, vanco fluids  Foot XR f/u results  Lactate 0.6       (25 Nov 2024 13:06)      ---  HOSPITAL COURSE/PERTINENT LABS/PROCEDURES PERFORMED/PENDING TESTS:  Patient admitted to medicine for R toe wound. Patient underwent surgery with Podiatry- s/p Crowell arthroplasty. Patient tolerated procedure well. Discussed with ID, will need long term abx, PICC line placed. Patient with leukocytosis on POD#1, likely reactive from recent surgery. Patient states he is feeling well, eager to go home. Patient medically stable for discharge with close follow up with wound care.    ---  PATIENT CONDITION:  - stable    ---  PHYSICAL EXAM ON DAY OF DISCHARGE:  General: laying comfortably in bed, NAD  Neurology: A&Ox3, nonfocal, MENDES x 4  Respiratory: CTA B/L, no w/r/r  CV: RRR, S1S2, no murmurs, rubs or gallops  Abdominal: Soft, NT, ND +BS  Extremities: R foot wrapped in gauze- C/D/I, + peripheral pulses    ---  CONSULTANTS:   Dr. Sevilla (Podiatry)  Dr. Arevalo (ID)    ---  TIME SPENT:  I, the attending physician, was physically present for the key portions of the evaluation and management (E/M) service provided. The total amount of time spent reviewing the hospital notes, laboratory values, imaging findings, assessing/counseling the patient, discussing with consultant physicians, social work, nursing staff was 39 minutes

## 2024-11-27 NOTE — DISCHARGE NOTE PROVIDER - NSDCMRMEDTOKEN_GEN_ALL_CORE_FT
atorvastatin 10 mg oral tablet: 1 tab(s) orally once a day  ceFAZolin 2 g intravenous injection: 2 gram(s) intravenous every 8 hours

## 2024-11-27 NOTE — DISCHARGE NOTE PROVIDER - NSDCCPCAREPLAN_GEN_ALL_CORE_FT
PRINCIPAL DISCHARGE DIAGNOSIS  Diagnosis: Non-healing ulcer of foot  Assessment and Plan of Treatment: You underwent surgery with Podiatry- s/p Crowell arthroplasty. Continue IV antibiotics as prescribed. Please obtain weekly blood work- CBC, CMP, ESR, CRP  Wound Care Instructions:  -Keep dressing clean, dry, and intact to the right foot.  -Apply adaptic, Aquacel Ag, gauze, abd, maynor, and ACE, change every other day   -Patient PWB to heel in surgical shoe   -Monitor for any signs of infection including redness, swelling in the leg above the bandage, nausea/vomiting/fever/chills/chest pain/shortness of breath, if any are present patient must report to the emergency department immediately  -Patient is to follow up with Dr. Russell/Dr. Stanton/ Dr. Sevilla  within 5 days after discharge at [facility] [place]. Please call to make an appointment [phone].     PRINCIPAL DISCHARGE DIAGNOSIS  Diagnosis: Non-healing ulcer of foot  Assessment and Plan of Treatment: You underwent surgery with Podiatry- s/p Crowell arthroplasty. Continue IV antibiotics as prescribed. Please obtain weekly blood work- CBC, CMP, ESR, CRP  Wound Care Instructions:  -Keep dressing clean, dry, and intact to the right foot.  -Apply adaptic, Aquacel Ag, gauze, abd, maynor, and ACE, change every other day   -Patient PWB to heel in surgical shoe   -Monitor for any signs of infection including redness, swelling in the leg above the bandage, nausea/vomiting/fever/chills/chest pain/shortness of breath, if any are present patient must report to the emergency department immediately  -Patient is to follow up with Dr. Russell/Dr. Stanton/ Dr. Sevilla  within 5 days after discharge at [facility]. Please call to make an appointment [phone].      SECONDARY DISCHARGE DIAGNOSES  Diagnosis: Total bilirubin, elevated  Assessment and Plan of Treatment: Incidental findings on labs. You had stated this is a chronic condition for you. You denied any abdominal complaints. Follow up with PCP or GI within 1-2 weeks of discharge. If experiencing severe abdominal pain or noticing yellowing of skin/eyes, please return urgently to the Emergency Department.

## 2024-11-27 NOTE — PROGRESS NOTE ADULT - ASSESSMENT
59-year-old male history of obesity, HLD, R ankle fracture, who was sent in by Dr. Sevilla due to nonhealing right plantar hallux ulceration with osteomyelitis on MRI. Plar for Crowell Arthroplasty and PICC line placement. He underwent R 1st toe distal phalanx bone biopsy on 11/5, path showed mild chronic osteomyelitis and tissue culture no growth. Prior to that he had a wound culture which grew MSSA and group B strep. He had an MRI done 11/11 which showed Osteomyelitis of the distal phalanx of the first digit.    Plan for Crowell arthroplasty of right 1st toe today in conjunction with PICC line and IV antibiotics x 6 weeks. He has no fevers and no leukocytosis. CRP 3. PICC placed today.    #R 1st toe osteomyelitis  #R 1st toe ulcer    -continue cefazolin 2g IV q8h--plan for 6 week course until 1/6/25  -weekly CBC, CMP, CRP on discharge  -send OR cultures and path  -follow cultures to completion  -discussed with Dr. Bahena  -I will be covered by Dr. Bailey tomorrow, 11/27/24    Migdalia Arevalo MD  Division of Infectious Diseases   Cell [phone] between 8am and 6pm   After 6pm and weekends please call ID service at [phone].     35 minutes spent on total encounter assessing patient, examination, chart review, counseling and coordinating care by the attending physician/nurse/care manager.       
59-year-old male history of obesity, HLD sent in by Dr. Sevilla due to nonhealing right plantar hallux ulceration with osteomyelitis on MRI here for Crowell Arthroplasty and PICC line                
s/p right foot Crowell arthroplasty

## 2024-11-27 NOTE — PROGRESS NOTE ADULT - PROBLEM SELECTOR PLAN 1
Patient evaluated and chart reviewed.  He is POD #1 s/p right foot Crowell arthroplasty.  Dressings changed today.  Surgical pathology pending.  Pt will be discharged today by primary team and wll follow-up outpatient with Dr. Sevilla for continued local wound care and pathology results.  Plan discussed with attending.    Wound Care Instructions:  -Keep dressing clean, dry, and intact to the right foot.  -Apply adaptic, Aquacel Ag, gauze, abd, maynor, and ACE, change every other day   -Patient PWB to heel in surgical shoe   -Monitor for any signs of infection including redness, swelling in the leg above the bandage, nausea/vomiting/fever/chills/chest pain/shortness of breath, if any are present patient must report to the emergency department immediately  -Patient is to follow up with Dr. Russell/Dr. Stanton/ Dr. Sevilla  within 5 days after discharge at [facility] [place]. Please call to make an appointment [phone]
-Bone biopsy and MRI by Dr. Stanton earlier this month showed OM of 1st R toe  -Wound cx collected, Blood cultures x2 collected, f/u results  -tylenol 650 mg PO Q6H PRN pain and/or fever  -CBC, CMP f/u results AM   - Foot Xray showing bone infection of distal phalanx of R great toe  -Dr. Sevilla to perform Crowell Arthroplasty 11/26  -Dr. Arevalo consulted, continue IV antibiotics- will need PICC line for 6 week course- IR consulted for PICC Line placement  -EKG showing NSR  -RCRI score 0- patient is low risk for Podiatry procedure and is medically optimized

## 2024-11-27 NOTE — PROGRESS NOTE ADULT - SUBJECTIVE AND OBJECTIVE BOX
[facility]  INFECTIOUS DISEASES - [address], [place] [address]  Tel: [phone]     Fax: [phone]  =======================================================    EMILE YENIFER [id number]    Follow up: No fevers. Denies any acute complaints.    Allergies:  No Known Allergies      Antibiotics:  acetaminophen     Tablet .. 650 milliGRAM(s) Oral every 6 hours PRN  ceFAZolin   IVPB 2000 milliGRAM(s) IV Intermittent every 8 hours  chlorhexidine 2% Cloths 1 Application(s) Topical <User Schedule>  melatonin 3 milliGRAM(s) Oral at bedtime PRN  sodium chloride 0.9% lock flush 10 milliLiter(s) IV Push every 1 hour PRN       REVIEW OF SYSTEMS:  CONSTITUTIONAL:  No Fever or chills  HEENT:  No sore throat or runny nose.  CARDIOVASCULAR:  No chest pain or SOB.  RESPIRATORY:  No cough, shortness of breath  GASTROINTESTINAL:  No nausea, vomiting or diarrhea.  GENITOURINARY:  No dysuria  MUSCULOSKELETAL:  no joint aches, no muscle pain  SKIN:  + R 1st toe ulcer  NEUROLOGIC:  No headache or dizziness  PSYCHIATRIC:  No disorder of thought or mood.     Physical Exam:  ICU Vital Signs Last 24 Hrs  T(C): 36.6 (26 Nov 2024 16:08), Max: 36.7 (26 Nov 2024 13:04)  T(F): 97.9 (26 Nov 2024 16:08), Max: 98.1 (26 Nov 2024 13:04)  HR: 63 (26 Nov 2024 16:08) (57 - 72)  BP: 135/86 (26 Nov 2024 16:08) (120/75 - 156/92)  BP(mean): --  ABP: --  ABP(mean): --  RR: 14 (26 Nov 2024 16:08) (14 - 18)  SpO2: 97% (26 Nov 2024 16:08) (92% - 97%)    O2 Parameters below as of 26 Nov 2024 16:08  Patient On (Oxygen Delivery Method): room air      GEN: NAD  HEENT: normocephalic and atraumatic.   NECK: Supple.   LUNGS: Normal respiratory effort  HEART: Regular rate and rhythm   ABDOMEN: Soft, nontender, and nondistended.    EXTREMITIES: No leg edema.  NEUROLOGIC: grossly intact.  PSYCHIATRIC: Appropriate affect .  SKIN: R 1st toe swelling, R 1st toe plantar ulcer, no active drainage    Labs:  11-26    140  |  106  |  15  ----------------------------<  96  3.6   |  31  |  0.92    Ca    8.8      26 Nov 2024 07:24    TPro  6.7  /  Alb  3.6  /  TBili  2.4[H]  /  DBili  x   /  AST  20  /  ALT  28  /  AlkPhos  84  11-26                          14.4   4.58  )-----------( 183      ( 26 Nov 2024 07:24 )             42.0     PT/INR - ( 25 Nov 2024 12:00 )   PT: 12.1 sec;   INR: 1.03 ratio         PTT - ( 25 Nov 2024 12:00 )  PTT:34.1 sec  Urinalysis Basic - ( 26 Nov 2024 07:24 )    Color: x / Appearance: x / SG: x / pH: x  Gluc: 96 mg/dL / Ketone: x  / Bili: x / Urobili: x   Blood: x / Protein: x / Nitrite: x   Leuk Esterase: x / RBC: x / WBC x   Sq Epi: x / Non Sq Epi: x / Bacteria: x      LIVER FUNCTIONS - ( 26 Nov 2024 07:24 )  Alb: 3.6 g/dL / Pro: 6.7 g/dL / ALK PHOS: 84 U/L / ALT: 28 U/L / AST: 20 U/L / GGT: x             RECENT CULTURES:  11-15 @ 15:30 Tissue     No growth at 5 days    Rare polymorphonuclear leukocytes seen per low power field  No organisms seen per oil power field            All imaging and data are reviewed.   
INTERVAL HPI/OVERNIGHT EVENTS:  Patient seen and examined at bedside. States he is feeling well, denies any chest pain, SOB, abd pain. Patient planned for OR today with Podiatry due to R great toe wound. Patient denies any pain currently. Patient aware need for long term antibiotics post OR, will plan for PICC today.    ROS: All other review of systems is negative unless indicated above.    MEDICATIONS  (STANDING):  ceFAZolin   IVPB 2000 milliGRAM(s) IV Intermittent every 8 hours  chlorhexidine 2% Cloths 1 Application(s) Topical <User Schedule>    MEDICATIONS  (PRN):  acetaminophen     Tablet .. 650 milliGRAM(s) Oral every 6 hours PRN Temp greater or equal to 38C (100.4F), Mild Pain (1 - 3)  melatonin 3 milliGRAM(s) Oral at bedtime PRN Insomnia  sodium chloride 0.9% lock flush 10 milliLiter(s) IV Push every 1 hour PRN Pre/post blood products, medications, blood draw, and to maintain line patency      Allergies    No Known Allergies    Intolerances          Vital Signs Last 24 Hrs  T(C): 36.2 (26 Nov 2024 05:08), Max: 36.6 (25 Nov 2024 20:06)  T(F): 97.1 (26 Nov 2024 05:08), Max: 97.9 (25 Nov 2024 20:06)  HR: 66 (26 Nov 2024 05:08) (60 - 72)  BP: 120/75 (26 Nov 2024 05:08) (120/75 - 156/92)  BP(mean): --  RR: 17 (26 Nov 2024 05:08) (16 - 18)  SpO2: 92% (26 Nov 2024 05:08) (92% - 100%)    Parameters below as of 26 Nov 2024 05:08  Patient On (Oxygen Delivery Method): room air        Physical Exam:  General: laying comfortably in bed, NAD  Neurology: A&Ox3, nonfocal, MENDES x 4  Respiratory: CTA B/L, no w/r/r  CV: RRR, S1S2, no murmurs, rubs or gallops  Abdominal: Soft, NT, ND +BS  Extremities: R foot wrapped in gauze- C/D/I, + peripheral pulses      LABS:                        14.4   4.58  )-----------( 183      ( 26 Nov 2024 07:24 )             42.0     11-26    140  |  106  |  15  ----------------------------<  96  3.6   |  31  |  0.92    Ca    8.8      26 Nov 2024 07:24    TPro  6.7  /  Alb  3.6  /  TBili  2.4[H]  /  DBili  x   /  AST  20  /  ALT  28  /  AlkPhos  84  11-26    PT/INR - ( 25 Nov 2024 12:00 )   PT: 12.1 sec;   INR: 1.03 ratio         PTT - ( 25 Nov 2024 12:00 )  PTT:34.1 sec  Urinalysis Basic - ( 26 Nov 2024 07:24 )    Color: x / Appearance: x / SG: x / pH: x  Gluc: 96 mg/dL / Ketone: x  / Bili: x / Urobili: x   Blood: x / Protein: x / Nitrite: x   Leuk Esterase: x / RBC: x / WBC x   Sq Epi: x / Non Sq Epi: x / Bacteria: x        RADIOLOGY & ADDITIONAL TESTS:
59y year old Male seen at [facility] 1EAS 103 W1 s/p right foot Crowell arthroplasty. Patient relates no overnight events and states that they are doing well at this time.  Denies any fever, chills, nausea, vomiting, chest pain, shortness of breath, or calf pain at this time.    Allergies    No Known Allergies    Intolerances        MEDICATIONS  (STANDING):  ceFAZolin   IVPB 2000 milliGRAM(s) IV Intermittent every 8 hours  chlorhexidine 2% Cloths 1 Application(s) Topical <User Schedule>    MEDICATIONS  (PRN):  acetaminophen     Tablet .. 650 milliGRAM(s) Oral every 6 hours PRN Temp greater or equal to 38C (100.4F), Mild Pain (1 - 3)  melatonin 3 milliGRAM(s) Oral at bedtime PRN Insomnia  sodium chloride 0.9% lock flush 10 milliLiter(s) IV Push every 1 hour PRN Pre/post blood products, medications, blood draw, and to maintain line patency      Vital Signs Last 24 Hrs  T(C): 36.3 (27 Nov 2024 04:50), Max: 36.7 (26 Nov 2024 13:04)  T(F): 97.3 (27 Nov 2024 04:50), Max: 98.1 (26 Nov 2024 13:04)  HR: 61 (27 Nov 2024 04:50) (57 - 84)  BP: 116/71 (27 Nov 2024 04:50) (116/71 - 139/84)  BP(mean): 88 (26 Nov 2024 18:20) (86 - 94)  RR: 16 (27 Nov 2024 04:50) (11 - 18)  SpO2: 93% (27 Nov 2024 04:50) (93% - 99%)    Parameters below as of 27 Nov 2024 04:50  Patient On (Oxygen Delivery Method): room air        PHYSICAL EXAM:  Vascular: Dorsalis Pedis and Posterior Tibial pulses 2/4.  Capillary re-fill time less then 3 seconds digits 1-5 bilateral.    Neuro: Protective sensation intact to the level of the digits bilateral.  MSK: Muscle strength 5/5 all major muscle groups bilateral.  Dermatological: Incision site of the right foot noted with intact sutures, no dehiscence, mild capo-incision erythema, no proximal streaking, no fluctuance, no malodor, no signs of infection at this time.                          14.4   4.58  )-----------( 183      ( 26 Nov 2024 07:24 )             42.0       11-26    140  |  106  |  15  ----------------------------<  96  3.6   |  31  |  0.92    Ca    8.8      26 Nov 2024 07:24    TPro  6.7  /  Alb  3.6  /  TBili  2.4[H]  /  DBili  x   /  AST  20  /  ALT  28  /  AlkPhos  84  11-26      PT/INR - ( 25 Nov 2024 12:00 )   PT: 12.1 sec;   INR: 1.03 ratio         PTT - ( 25 Nov 2024 12:00 )  PTT:34.1 sec      Culture - Tissue with Gram Stain (collected 26 Nov 2024 17:20)  Source: Tissue  Gram Stain (27 Nov 2024 06:39):    Rare polymorphonuclear leukocytes seen per low power field    No organisms seen per oil power field    Culture - Blood (collected 25 Nov 2024 12:00)  Source: .Blood BLOOD  Preliminary Report (26 Nov 2024 19:02):    No growth at 24 hours    Culture - Blood (collected 25 Nov 2024 11:55)  Source: .Blood BLOOD  Preliminary Report (26 Nov 2024 19:02):    No growth at 24 hours

## 2024-11-27 NOTE — CASE MANAGEMENT PROGRESS NOTE - NSCMPROGRESSNOTE_GEN_ALL_CORE
Patient is anticipated for DC home today with home infusion/[facility] services. Patient has been accepted by [facility] for home IV antibiotics, confirmed with Danial, from [facility], that they will see patient for 2pm dose today at home. Patient confirmed he is willing and able to learn home infusion administration. Discussed [facility] services for wound care; patient states he is willing to learn and perform wound care. He is in agreement with [facility] services with [facility]; referral sent pending acceptance. Per patient, he plans on following up with the [facility] on Friday or Monday, depending on appointment availability. He confirmed his son will be transporting him home upon DC. DC notice reviewed and explained to patient; he verbalized understanding. He is in agreement to DC plan. CM remains available.

## 2024-11-28 DIAGNOSIS — E78.5 HYPERLIPIDEMIA, UNSPECIFIED: ICD-10-CM

## 2024-11-28 DIAGNOSIS — Z87.81 PERSONAL HISTORY OF (HEALED) TRAUMATIC FRACTURE: ICD-10-CM

## 2024-11-28 DIAGNOSIS — Z86.0100 PERSONAL HISTORY OF COLON POLYPS, UNSPECIFIED: ICD-10-CM

## 2024-11-28 DIAGNOSIS — M86.9 OSTEOMYELITIS, UNSPECIFIED: ICD-10-CM

## 2024-11-28 DIAGNOSIS — Z90.49 ACQUIRED ABSENCE OF OTHER SPECIFIED PARTS OF DIGESTIVE TRACT: ICD-10-CM

## 2024-11-28 DIAGNOSIS — L89.894 PRESSURE ULCER OF OTHER SITE, STAGE 4: ICD-10-CM

## 2024-11-28 DIAGNOSIS — Z80.0 FAMILY HISTORY OF MALIGNANT NEOPLASM OF DIGESTIVE ORGANS: ICD-10-CM

## 2024-11-30 LAB
CULTURE RESULTS: SIGNIFICANT CHANGE UP
CULTURE RESULTS: SIGNIFICANT CHANGE UP
SPECIMEN SOURCE: SIGNIFICANT CHANGE UP
SPECIMEN SOURCE: SIGNIFICANT CHANGE UP

## 2024-12-02 ENCOUNTER — APPOINTMENT (OUTPATIENT)
Dept: WOUND CARE | Facility: HOSPITAL | Age: 59
End: 2024-12-02

## 2024-12-02 ENCOUNTER — OUTPATIENT (OUTPATIENT)
Dept: OUTPATIENT SERVICES | Facility: HOSPITAL | Age: 59
LOS: 1 days | Discharge: HOME | End: 2024-12-02
Payer: COMMERCIAL

## 2024-12-02 VITALS
SYSTOLIC BLOOD PRESSURE: 118 MMHG | DIASTOLIC BLOOD PRESSURE: 80 MMHG | RESPIRATION RATE: 16 BRPM | HEIGHT: 77 IN | WEIGHT: 300 LBS | OXYGEN SATURATION: 95 % | BODY MASS INDEX: 35.42 KG/M2 | HEART RATE: 73 BPM | TEMPERATURE: 98.1 F

## 2024-12-02 DIAGNOSIS — Z98.890 OTHER SPECIFIED POSTPROCEDURAL STATES: Chronic | ICD-10-CM

## 2024-12-02 DIAGNOSIS — S91.309A UNSPECIFIED OPEN WOUND, UNSPECIFIED FOOT, INITIAL ENCOUNTER: ICD-10-CM

## 2024-12-02 LAB
CULTURE RESULTS: NO GROWTH — SIGNIFICANT CHANGE UP
SPECIMEN SOURCE: SIGNIFICANT CHANGE UP

## 2024-12-02 PROCEDURE — G0463: CPT

## 2024-12-02 PROCEDURE — 99212 OFFICE O/P EST SF 10 MIN: CPT

## 2024-12-04 PROBLEM — M86.671 OSTEOMYELITIS, CHRONIC, ANKLE OR FOOT, RIGHT: Status: ACTIVE | Noted: 2024-12-04

## 2024-12-10 DIAGNOSIS — Z86.0100 PERSONAL HISTORY OF COLON POLYPS, UNSPECIFIED: ICD-10-CM

## 2024-12-10 DIAGNOSIS — E66.9 OBESITY, UNSPECIFIED: ICD-10-CM

## 2024-12-10 DIAGNOSIS — M86.9 OSTEOMYELITIS, UNSPECIFIED: ICD-10-CM

## 2024-12-10 DIAGNOSIS — Z80.0 FAMILY HISTORY OF MALIGNANT NEOPLASM OF DIGESTIVE ORGANS: ICD-10-CM

## 2024-12-10 DIAGNOSIS — L89.894 PRESSURE ULCER OF OTHER SITE, STAGE 4: ICD-10-CM

## 2024-12-10 DIAGNOSIS — Z87.81 PERSONAL HISTORY OF (HEALED) TRAUMATIC FRACTURE: ICD-10-CM

## 2024-12-10 DIAGNOSIS — Z98.890 OTHER SPECIFIED POSTPROCEDURAL STATES: ICD-10-CM

## 2024-12-10 DIAGNOSIS — E78.5 HYPERLIPIDEMIA, UNSPECIFIED: ICD-10-CM

## 2024-12-11 ENCOUNTER — APPOINTMENT (OUTPATIENT)
Dept: WOUND CARE | Facility: HOSPITAL | Age: 59
End: 2024-12-11
Payer: COMMERCIAL

## 2024-12-11 ENCOUNTER — OUTPATIENT (OUTPATIENT)
Dept: OUTPATIENT SERVICES | Facility: HOSPITAL | Age: 59
LOS: 1 days | Discharge: HOME | End: 2024-12-11
Payer: COMMERCIAL

## 2024-12-11 VITALS
SYSTOLIC BLOOD PRESSURE: 126 MMHG | HEART RATE: 61 BPM | OXYGEN SATURATION: 97 % | RESPIRATION RATE: 15 BRPM | TEMPERATURE: 98.3 F | HEIGHT: 77 IN | DIASTOLIC BLOOD PRESSURE: 80 MMHG | WEIGHT: 300 LBS | BODY MASS INDEX: 35.42 KG/M2

## 2024-12-11 DIAGNOSIS — S91.309A UNSPECIFIED OPEN WOUND, UNSPECIFIED FOOT, INITIAL ENCOUNTER: ICD-10-CM

## 2024-12-11 DIAGNOSIS — M86.671 OTHER CHRONIC OSTEOMYELITIS, RIGHT ANKLE AND FOOT: ICD-10-CM

## 2024-12-11 DIAGNOSIS — L89.894 PRESSURE ULCER OF OTHER SITE, STAGE 4: ICD-10-CM

## 2024-12-11 DIAGNOSIS — Z90.49 ACQUIRED ABSENCE OF OTHER SPECIFIED PARTS OF DIGESTIVE TRACT: ICD-10-CM

## 2024-12-11 DIAGNOSIS — E78.5 HYPERLIPIDEMIA, UNSPECIFIED: ICD-10-CM

## 2024-12-11 DIAGNOSIS — Z80.0 FAMILY HISTORY OF MALIGNANT NEOPLASM OF DIGESTIVE ORGANS: ICD-10-CM

## 2024-12-11 DIAGNOSIS — Z98.890 OTHER SPECIFIED POSTPROCEDURAL STATES: Chronic | ICD-10-CM

## 2024-12-11 DIAGNOSIS — Z86.0100 PERSONAL HISTORY OF COLON POLYPS, UNSPECIFIED: ICD-10-CM

## 2024-12-11 DIAGNOSIS — Z87.81 PERSONAL HISTORY OF (HEALED) TRAUMATIC FRACTURE: ICD-10-CM

## 2024-12-11 DIAGNOSIS — Z98.890 OTHER SPECIFIED POSTPROCEDURAL STATES: ICD-10-CM

## 2024-12-11 PROCEDURE — 99213 OFFICE O/P EST LOW 20 MIN: CPT

## 2024-12-11 PROCEDURE — G0463: CPT

## 2024-12-18 ENCOUNTER — OUTPATIENT (OUTPATIENT)
Dept: OUTPATIENT SERVICES | Facility: HOSPITAL | Age: 59
LOS: 1 days | Discharge: HOME | End: 2024-12-18
Payer: COMMERCIAL

## 2024-12-18 ENCOUNTER — APPOINTMENT (OUTPATIENT)
Dept: WOUND CARE | Facility: HOSPITAL | Age: 59
End: 2024-12-18
Payer: COMMERCIAL

## 2024-12-18 VITALS
HEART RATE: 73 BPM | TEMPERATURE: 98.1 F | SYSTOLIC BLOOD PRESSURE: 145 MMHG | OXYGEN SATURATION: 98 % | DIASTOLIC BLOOD PRESSURE: 80 MMHG | RESPIRATION RATE: 16 BRPM

## 2024-12-18 DIAGNOSIS — Z87.81 PERSONAL HISTORY OF (HEALED) TRAUMATIC FRACTURE: ICD-10-CM

## 2024-12-18 DIAGNOSIS — Z98.890 OTHER SPECIFIED POSTPROCEDURAL STATES: Chronic | ICD-10-CM

## 2024-12-18 DIAGNOSIS — Z80.0 FAMILY HISTORY OF MALIGNANT NEOPLASM OF DIGESTIVE ORGANS: ICD-10-CM

## 2024-12-18 DIAGNOSIS — L89.894 PRESSURE ULCER OF OTHER SITE, STAGE 4: ICD-10-CM

## 2024-12-18 DIAGNOSIS — Z90.49 ACQUIRED ABSENCE OF OTHER SPECIFIED PARTS OF DIGESTIVE TRACT: ICD-10-CM

## 2024-12-18 DIAGNOSIS — E78.5 HYPERLIPIDEMIA, UNSPECIFIED: ICD-10-CM

## 2024-12-18 DIAGNOSIS — M86.671 OTHER CHRONIC OSTEOMYELITIS, RIGHT ANKLE AND FOOT: ICD-10-CM

## 2024-12-18 DIAGNOSIS — Z98.890 OTHER SPECIFIED POSTPROCEDURAL STATES: ICD-10-CM

## 2024-12-18 DIAGNOSIS — Z86.0100 PERSONAL HISTORY OF COLON POLYPS, UNSPECIFIED: ICD-10-CM

## 2024-12-18 PROCEDURE — G0463: CPT

## 2024-12-18 PROCEDURE — 99213 OFFICE O/P EST LOW 20 MIN: CPT

## 2024-12-23 ENCOUNTER — APPOINTMENT (OUTPATIENT)
Dept: WOUND CARE | Facility: HOSPITAL | Age: 59
End: 2024-12-23
Payer: COMMERCIAL

## 2024-12-23 ENCOUNTER — OUTPATIENT (OUTPATIENT)
Dept: OUTPATIENT SERVICES | Facility: HOSPITAL | Age: 59
LOS: 1 days | Discharge: HOME | End: 2024-12-23
Payer: COMMERCIAL

## 2024-12-23 VITALS
HEIGHT: 77 IN | SYSTOLIC BLOOD PRESSURE: 129 MMHG | WEIGHT: 300 LBS | TEMPERATURE: 97.8 F | DIASTOLIC BLOOD PRESSURE: 83 MMHG | OXYGEN SATURATION: 99 % | HEART RATE: 66 BPM | BODY MASS INDEX: 35.42 KG/M2 | RESPIRATION RATE: 18 BRPM

## 2024-12-23 DIAGNOSIS — S91.309A UNSPECIFIED OPEN WOUND, UNSPECIFIED FOOT, INITIAL ENCOUNTER: ICD-10-CM

## 2024-12-23 DIAGNOSIS — Z98.890 OTHER SPECIFIED POSTPROCEDURAL STATES: Chronic | ICD-10-CM

## 2024-12-23 PROCEDURE — G0463: CPT

## 2024-12-23 PROCEDURE — 99213 OFFICE O/P EST LOW 20 MIN: CPT

## 2024-12-24 DIAGNOSIS — Z80.0 FAMILY HISTORY OF MALIGNANT NEOPLASM OF DIGESTIVE ORGANS: ICD-10-CM

## 2024-12-24 DIAGNOSIS — Z90.49 ACQUIRED ABSENCE OF OTHER SPECIFIED PARTS OF DIGESTIVE TRACT: ICD-10-CM

## 2024-12-24 DIAGNOSIS — L89.894 PRESSURE ULCER OF OTHER SITE, STAGE 4: ICD-10-CM

## 2024-12-24 DIAGNOSIS — Z98.890 OTHER SPECIFIED POSTPROCEDURAL STATES: ICD-10-CM

## 2024-12-24 DIAGNOSIS — M86.671 OTHER CHRONIC OSTEOMYELITIS, RIGHT ANKLE AND FOOT: ICD-10-CM

## 2024-12-24 DIAGNOSIS — E78.5 HYPERLIPIDEMIA, UNSPECIFIED: ICD-10-CM

## 2024-12-24 DIAGNOSIS — Z87.81 PERSONAL HISTORY OF (HEALED) TRAUMATIC FRACTURE: ICD-10-CM

## 2024-12-24 DIAGNOSIS — Z86.0100 PERSONAL HISTORY OF COLON POLYPS, UNSPECIFIED: ICD-10-CM

## 2024-12-30 ENCOUNTER — APPOINTMENT (OUTPATIENT)
Dept: WOUND CARE | Facility: HOSPITAL | Age: 59
End: 2024-12-30
Payer: COMMERCIAL

## 2024-12-30 ENCOUNTER — OUTPATIENT (OUTPATIENT)
Dept: OUTPATIENT SERVICES | Facility: HOSPITAL | Age: 59
LOS: 1 days | Discharge: HOME | End: 2024-12-30
Payer: COMMERCIAL

## 2024-12-30 VITALS
OXYGEN SATURATION: 99 % | TEMPERATURE: 98.5 F | SYSTOLIC BLOOD PRESSURE: 125 MMHG | RESPIRATION RATE: 18 BRPM | DIASTOLIC BLOOD PRESSURE: 75 MMHG | HEIGHT: 77 IN | BODY MASS INDEX: 35.42 KG/M2 | WEIGHT: 300 LBS | HEART RATE: 68 BPM

## 2024-12-30 DIAGNOSIS — M86.671 OTHER CHRONIC OSTEOMYELITIS, RIGHT ANKLE AND FOOT: ICD-10-CM

## 2024-12-30 DIAGNOSIS — E78.5 HYPERLIPIDEMIA, UNSPECIFIED: ICD-10-CM

## 2024-12-30 DIAGNOSIS — Z98.890 OTHER SPECIFIED POSTPROCEDURAL STATES: Chronic | ICD-10-CM

## 2024-12-30 DIAGNOSIS — Z98.890 OTHER SPECIFIED POSTPROCEDURAL STATES: ICD-10-CM

## 2024-12-30 DIAGNOSIS — L89.894 PRESSURE ULCER OF OTHER SITE, STAGE 4: ICD-10-CM

## 2024-12-30 DIAGNOSIS — Z86.0100 PERSONAL HISTORY OF COLON POLYPS, UNSPECIFIED: ICD-10-CM

## 2024-12-30 DIAGNOSIS — Z87.81 PERSONAL HISTORY OF (HEALED) TRAUMATIC FRACTURE: ICD-10-CM

## 2024-12-30 DIAGNOSIS — Z80.0 FAMILY HISTORY OF MALIGNANT NEOPLASM OF DIGESTIVE ORGANS: ICD-10-CM

## 2024-12-30 DIAGNOSIS — S91.309A UNSPECIFIED OPEN WOUND, UNSPECIFIED FOOT, INITIAL ENCOUNTER: ICD-10-CM

## 2024-12-30 PROCEDURE — G0463: CPT

## 2024-12-30 PROCEDURE — 99212 OFFICE O/P EST SF 10 MIN: CPT

## 2024-12-31 ENCOUNTER — NON-APPOINTMENT (OUTPATIENT)
Age: 59
End: 2024-12-31

## 2025-01-22 ENCOUNTER — APPOINTMENT (OUTPATIENT)
Dept: WOUND CARE | Facility: HOSPITAL | Age: 60
End: 2025-01-22
Payer: COMMERCIAL

## 2025-01-22 ENCOUNTER — OUTPATIENT (OUTPATIENT)
Dept: OUTPATIENT SERVICES | Facility: HOSPITAL | Age: 60
LOS: 1 days | Discharge: HOME | End: 2025-01-22
Payer: COMMERCIAL

## 2025-01-22 VITALS
OXYGEN SATURATION: 97 % | SYSTOLIC BLOOD PRESSURE: 100 MMHG | BODY MASS INDEX: 35.42 KG/M2 | HEART RATE: 66 BPM | TEMPERATURE: 97.9 F | HEIGHT: 77 IN | WEIGHT: 300 LBS | RESPIRATION RATE: 18 BRPM | DIASTOLIC BLOOD PRESSURE: 65 MMHG

## 2025-01-22 DIAGNOSIS — Z98.890 OTHER SPECIFIED POSTPROCEDURAL STATES: Chronic | ICD-10-CM

## 2025-01-22 DIAGNOSIS — L89.894 PRESSURE ULCER OF OTHER SITE, STAGE 4: ICD-10-CM

## 2025-01-22 DIAGNOSIS — M86.671 OTHER CHRONIC OSTEOMYELITIS, RIGHT ANKLE AND FOOT: ICD-10-CM

## 2025-01-22 DIAGNOSIS — S91.309A UNSPECIFIED OPEN WOUND, UNSPECIFIED FOOT, INITIAL ENCOUNTER: ICD-10-CM

## 2025-01-22 PROCEDURE — 99024 POSTOP FOLLOW-UP VISIT: CPT

## 2025-01-22 PROCEDURE — G0463: CPT

## 2025-01-23 DIAGNOSIS — M86.671 OTHER CHRONIC OSTEOMYELITIS, RIGHT ANKLE AND FOOT: ICD-10-CM

## 2025-01-23 DIAGNOSIS — Z80.0 FAMILY HISTORY OF MALIGNANT NEOPLASM OF DIGESTIVE ORGANS: ICD-10-CM

## 2025-01-23 DIAGNOSIS — Z90.49 ACQUIRED ABSENCE OF OTHER SPECIFIED PARTS OF DIGESTIVE TRACT: ICD-10-CM

## 2025-01-23 DIAGNOSIS — E78.5 HYPERLIPIDEMIA, UNSPECIFIED: ICD-10-CM

## 2025-01-23 DIAGNOSIS — Z86.0100 PERSONAL HISTORY OF COLON POLYPS, UNSPECIFIED: ICD-10-CM

## 2025-01-23 DIAGNOSIS — Z87.81 PERSONAL HISTORY OF (HEALED) TRAUMATIC FRACTURE: ICD-10-CM

## 2025-01-23 DIAGNOSIS — L89.894 PRESSURE ULCER OF OTHER SITE, STAGE 4: ICD-10-CM

## 2025-01-23 DIAGNOSIS — Z98.890 OTHER SPECIFIED POSTPROCEDURAL STATES: ICD-10-CM

## 2025-04-30 NOTE — PATIENT PROFILE ADULT - NSPROPTRIGHTSUPPORTPERSON_GEN_A_NUR
Please print the  WKC-16 or WKC-16B incomplete scan   out of the Med Info Encounter or  Tab. Please see the cover sheet for return instructions. Thank you  
This is a reminder that the WKC-16/WKC-16B form is still waiting to be completed.  Please complete the form as soon as possible. It can be found under the media tab labeled WKC-16/WKC-16B incomplete scan.  Please return the Completed/Signed form Via fax to [phone]
yes

## (undated) DEVICE — SOL IRR POUR NS 0.9% 1000ML

## (undated) DEVICE — SUT POLYSORB 3-0 30" V-20 UNDYED

## (undated) DEVICE — CLEANER FOR ELCTR TIP

## (undated) DEVICE — FORCEP RADIAL JAW 4 JUMBO W NDL 2.8MM 3.2MM 240CM ORANGE DISP

## (undated) DEVICE — DRSG CAST FIBERGLASS 4"

## (undated) DEVICE — PLV-SCD MACHINE: Type: DURABLE MEDICAL EQUIPMENT

## (undated) DEVICE — VENODYNE/SCD SLEEVE CALF LARGE

## (undated) DEVICE — XI TIP COVER

## (undated) DEVICE — XI VESSEL SEALER

## (undated) DEVICE — DRSG CAST PLASTER 6" (BLUE)

## (undated) DEVICE — VENODYNE/SCD SLEEVE CALF MEDIUM

## (undated) DEVICE — STAPLER COVIDIEN ENDO GIA STANDARD HANDLE

## (undated) DEVICE — PLV/PSP-TOURNIQUET #1 3006JAEN: Type: DURABLE MEDICAL EQUIPMENT

## (undated) DEVICE — SUT POLYSORB 2-0 30" V-20 UNDYED

## (undated) DEVICE — DRAPE 3/4 SHEET W REINFORCEMENT 56X77"

## (undated) DEVICE — TUBING CONNECTING 6MM 20FT

## (undated) DEVICE — PACK ROBOTIC

## (undated) DEVICE — DRAPE TOWEL BLUE STICKY

## (undated) DEVICE — XI SEAL UNIV 5- 8 MM

## (undated) DEVICE — GLV 6.5 PROTEXIS (BLUE)

## (undated) DEVICE — SAW BLADE LINVATEC SAGITTAL MIC 9.5X25.5X0.4MM

## (undated) DEVICE — SUT MONOCRYL 4-0 27" PS-2 UNDYED

## (undated) DEVICE — DRSG TELFA 3 X 8

## (undated) DEVICE — DRSG KLING 3"

## (undated) DEVICE — DRSG KLING 6"

## (undated) DEVICE — DRSG KERLIX ROLL LG 4.5"

## (undated) DEVICE — TUBING STRYKEFLOW II SUCTION / IRRIGATOR

## (undated) DEVICE — DRAPE MAJOR ABDOMINAL W POUCHES

## (undated) DEVICE — SUT POLYSORB 5-0 18" P-12 UNDYED

## (undated) DEVICE — SUT POLYSORB 4-0 30" V-20 UNDYED

## (undated) DEVICE — TOURNIQUET CUFF 18" DUAL PORT SINGLE BLADDER LUER LOCK (BLACK)

## (undated) DEVICE — SAW BLADE RASP CONMED LINVATEC MICRO 100 OSCILLATING 5.5 X 13 X 0.4MM

## (undated) DEVICE — PACK LOWER EXTREMITY NS PLAINVI

## (undated) DEVICE — ENDOCATCH 10MM SPECIMEN POUCH

## (undated) DEVICE — DRSG WEBRIL 6"

## (undated) DEVICE — D HELP - CLEARVIEW CLEARIFY SYSTEM

## (undated) DEVICE — ELCTR CORD FOOTSWITCH 1PLR LAPSCP 10FT

## (undated) DEVICE — SUT MONOSOF 4-0 18" P-12

## (undated) DEVICE — DRAPE XL SHEET 77X98"

## (undated) DEVICE — VALVE ENDOSCOPE DEFENDO SINGLE USE

## (undated) DEVICE — XI OBTURATOR OPTICAL BLADELESS 8MM

## (undated) DEVICE — DRSG ADAPTIC 3 X 8"

## (undated) DEVICE — BUR MICROAIRE CARBIDE OVAL 4MM 8 FLUTE

## (undated) DEVICE — Device

## (undated) DEVICE — SUT VICRYL 0 27" CT-2 UNDYED

## (undated) DEVICE — DRSG WEBRIL 3"

## (undated) DEVICE — WARMING BLANKET UPPER ADULT

## (undated) DEVICE — XI DRAPE COLUMN

## (undated) DEVICE — GLV 7 PROTEXIS (WHITE)